# Patient Record
Sex: FEMALE | Race: BLACK OR AFRICAN AMERICAN | Employment: UNEMPLOYED | URBAN - METROPOLITAN AREA
[De-identification: names, ages, dates, MRNs, and addresses within clinical notes are randomized per-mention and may not be internally consistent; named-entity substitution may affect disease eponyms.]

---

## 2022-02-13 ENCOUNTER — APPOINTMENT (OUTPATIENT)
Dept: GENERAL RADIOLOGY | Age: 52
End: 2022-02-13
Payer: MEDICARE

## 2022-02-13 ENCOUNTER — HOSPITAL ENCOUNTER (EMERGENCY)
Age: 52
Discharge: HOME | End: 2022-02-13
Payer: MEDICARE

## 2022-02-13 VITALS
DIASTOLIC BLOOD PRESSURE: 97 MMHG | BODY MASS INDEX: 30.38 KG/M2 | SYSTOLIC BLOOD PRESSURE: 147 MMHG | HEART RATE: 92 BPM | OXYGEN SATURATION: 100 % | TEMPERATURE: 97 F | HEIGHT: 60 IN | RESPIRATION RATE: 16 BRPM | WEIGHT: 154.76 LBS
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
## 2022-02-13 DIAGNOSIS — S82.831A CLOSED FRACTURE OF DISTAL END OF RIGHT FIBULA, UNSPECIFIED FRACTURE MORPHOLOGY, INITIAL ENCOUNTER: Primary | ICD-10-CM

## 2022-02-13 PROCEDURE — 73610 X-RAY EXAM OF ANKLE: CPT

## 2022-02-13 PROCEDURE — 29515 APPLICATION SHORT LEG SPLINT: CPT

## 2022-02-13 PROCEDURE — 99283 EMERGENCY DEPT VISIT LOW MDM: CPT

## 2022-02-13 PROCEDURE — 6370000000 HC RX 637 (ALT 250 FOR IP): Performed by: NURSE PRACTITIONER

## 2022-02-13 PROCEDURE — 73620 X-RAY EXAM OF FOOT: CPT

## 2022-02-13 RX ORDER — IBUPROFEN 400 MG/1
800 TABLET ORAL ONCE
Status: COMPLETED | OUTPATIENT
Start: 2022-02-13 | End: 2022-02-13

## 2022-02-13 RX ORDER — HYDROCODONE BITARTRATE AND ACETAMINOPHEN 5; 325 MG/1; MG/1
1 TABLET ORAL ONCE
Status: COMPLETED | OUTPATIENT
Start: 2022-02-13 | End: 2022-02-13

## 2022-02-13 RX ORDER — HYDROCODONE BITARTRATE AND ACETAMINOPHEN 5; 325 MG/1; MG/1
1 TABLET ORAL EVERY 6 HOURS PRN
Qty: 10 TABLET | Refills: 0 | Status: SHIPPED | OUTPATIENT
Start: 2022-02-13 | End: 2022-02-16

## 2022-02-13 RX ORDER — NAPROXEN 500 MG/1
500 TABLET ORAL 2 TIMES DAILY PRN
Qty: 30 TABLET | Refills: 0 | Status: SHIPPED | OUTPATIENT
Start: 2022-02-13

## 2022-02-13 RX ADMIN — IBUPROFEN 800 MG: 400 TABLET, FILM COATED ORAL at 10:49

## 2022-02-13 RX ADMIN — HYDROCODONE BITARTRATE AND ACETAMINOPHEN 1 TABLET: 5; 325 TABLET ORAL at 10:49

## 2022-02-13 ASSESSMENT — PAIN DESCRIPTION - FREQUENCY: FREQUENCY: CONTINUOUS

## 2022-02-13 ASSESSMENT — PAIN SCALES - GENERAL
PAINLEVEL_OUTOF10: 8
PAINLEVEL_OUTOF10: 8

## 2022-02-13 ASSESSMENT — PAIN DESCRIPTION - ORIENTATION: ORIENTATION: RIGHT

## 2022-02-13 ASSESSMENT — PAIN DESCRIPTION - DESCRIPTORS: DESCRIPTORS: SORE

## 2022-02-13 ASSESSMENT — PAIN DESCRIPTION - PAIN TYPE: TYPE: ACUTE PAIN

## 2022-02-13 ASSESSMENT — PAIN DESCRIPTION - LOCATION: LOCATION: ANKLE

## 2022-02-13 NOTE — Clinical Note
Arabella Rodríguez was seen and treated in our emergency department on 2/13/2022. She may return to work on 02/20/2022. If you have any questions or concerns, please don't hesitate to call.       Faraz Purvis, APRN - CNP

## 2022-02-13 NOTE — ED PROVIDER NOTES
[address]  [address] [place] [address]  Dept: [phone]  Loc: [address] ENCOUNTER        This patient was not seen or evaluated by the attending physician. I evaluated this patient, the attending physician was available for consultation. CHIEF COMPLAINT    Chief Complaint   Patient presents with    Ankle Pain     Pt arrives with walker for eval of right ankle pain after slipping on ice Thursday ,hx of previous cva and surgery to right ankle       HPI    Gonzalo Velez is a 50 y.o. female who presents with pain localized in the right ankle. Onset was 4 days ago. The context was she was walking out of her house when she slipped on a patch of ice and fall. She states that she has had surgery to her ankle before in the past, and has been steadily swallowing since the injury event. No fevers or chills. No numbness or tingling distal to site of injury. Has been ambulatory with her walker, which she generally does need given her history of CVA, and some residual right-sided weakness from that. The pain severity is 8/10. The patient has no other associated injuries. The pain is aggravated by ambulation. States that she came to the emergency department for further evaluation and treatment. REVIEW OF SYSTEMS    General: No fever  Skin: No lacerations or puncture wounds  Musculoskeletal: see HPI, no other joint pain    PAST MEDICAL & SURGICAL HISTORY    Past Medical History:   Diagnosis Date    Hypertension      No past surgical history on file. CURRENT MEDICATIONS  (may include discharge medications prescribed in the ED)  Current Outpatient Rx   Medication Sig Dispense Refill    HYDROcodone-acetaminophen (NORCO) 5-325 MG per tablet Take 1 tablet by mouth every 6 hours as needed for Pain for up to 3 days. Intended supply: 3 days.  Take lowest dose possible to manage pain 10 tablet 0    naproxen (NAPROSYN) 500 MG tablet Take 1 tablet by mouth 2 times daily as needed for Pain (swelling) 30 tablet 0    lisinopril (PRINIVIL;ZESTRIL) 20 MG tablet Take 20 mg by mouth daily. Patient unsure of home dose. ALLERGIES    No Known Allergies    SOCIAL & FAMILY HISTORY    Social History     Socioeconomic History    Marital status: Single     Spouse name: Not on file    Number of children: Not on file    Years of education: Not on file    Highest education level: Not on file   Occupational History    Not on file   Tobacco Use    Smoking status: Never Smoker    Smokeless tobacco: Not on file   Substance and Sexual Activity    Alcohol use: No    Drug use: Not on file    Sexual activity: Not on file   Other Topics Concern    Not on file   Social History Narrative    Not on file     Social Determinants of Health     Financial Resource Strain:     Difficulty of Paying Living Expenses: Not on file   Food Insecurity:     Worried About Running Out of Food in the Last Year: Not on file    Luda of Food in the Last Year: Not on file   Transportation Needs:     Lack of Transportation (Medical): Not on file    Lack of Transportation (Non-Medical):  Not on file   Physical Activity:     Days of Exercise per Week: Not on file    Minutes of Exercise per Session: Not on file   Stress:     Feeling of Stress : Not on file   Social Connections:     Frequency of Communication with Friends and Family: Not on file    Frequency of Social Gatherings with Friends and Family: Not on file    Attends Hindu Services: Not on file    Active Member of Clubs or Organizations: Not on file    Attends Club or Organization Meetings: Not on file    Marital Status: Not on file   Intimate Partner Violence:     Fear of Current or Ex-Partner: Not on file    Emotionally Abused: Not on file    Physically Abused: Not on file    Sexually Abused: Not on file   Housing Stability:     Unable to Pay for Housing in the Last Year: Not on file    Number of Places Lived in the Last Year: Not on file    Unstable Housing in the Last Year: Not on file     No family history on file. PHYSICAL EXAM    VITAL SIGNS: BP (!) 147/97   Pulse 92   Temp 97 °F (36.1 °C) (Oral)   Resp 16   Ht 5' (1.524 m)   Wt 154 lb 12.2 oz (70.2 kg)   SpO2 100%   BMI 30.23 kg/m²   Constitutional:  Well developed, appears comfortable  HENT:  Atraumatic  Respiratory:  No retractions  Vascular: right DP pulse 2+, cap refill <3 sec  Musculoskeletal: Examination of the affected ankle and foot reveals: +generalized anterior foot and bilateral malleolar edema, no obvious deformity, no bony tenderness of the lateral malleolus, no bony tenderness of the medial malleolus, no navicular tenderness, no bony tenderness at the base of the fifth metatarsal; the ankle joint is stable, no intraosseous membrane tenderness, no proximal fibular tenderness  Integument:  No open wounds of the affected ankle, no erythema of the ankle  Neurologic:  Awake, alert, no slurred speech, sensation and motor intact in the affected extremity    RADIOLOGY   XR FOOT RIGHT (2 VIEWS)   Final Result   No acute osseous abnormality of the left foot. XR ANKLE RIGHT (MIN 3 VIEWS)   Final Result   Traumatic, acute, minimally displaced distal fibular fractures. PROCEDURES   SPLINT PLACEMENT  A short leg OCL splint was applied to the affected limb by the emergency department technician. I evaluated the splint after it was applied. The splint was in good position. The patient's extremity was neurovascularly intact after the splint placement. ED COURSE & MEDICAL DECISION MAKING    See EMR for medications prescribed. Differential diagnosis: fracture, dislocation, grade 3 sprain, syndesmotic sprain, vascular injury, neurologic injury, tendon injury, other    No evidence of neurovascular injury on exam.  Plain films as above.     I estimate there is LOW risk for COMPARTMENT SYNDROME, DEEP VENOUS THROMBOSIS, SEPTIC ARTHRITIS, OR NEUROVASCULAR INJURY, thus I consider the discharge disposition reasonable. Sandra Wesley and I have discussed the diagnosis and risks, and we agree with discharging home to follow-up with their primary doctor or the referral orthopedist. We also discussed returning to the Emergency Department immediately if new or worsening symptoms occur. We have discussed the symptoms which are most concerning (e.g., changing or worsening pain, numbness, weakness) that necessitate immediate return. Blood pressure (!) 147/97, pulse 92, temperature 97 °F (36.1 °C), temperature source Oral, resp. rate 16, height 5' (1.524 m), weight 154 lb 12.2 oz (70.2 kg), SpO2 100 %. I instructed the patient to follow up as an outpatient in 2 days. I instructed the patient to return to the ED immediately for any new or worsening symptoms. The patient verbalizes understanding. FINAL IMPRESSION    1.  Closed fracture of distal end of right fibula, unspecified fracture morphology, initial encounter        PLAN  Discharge with outpatient follow-up (see EMR)    (Please note that this note was completed with a voice recognition program.  Every attempt was made to edit the dictations, but inevitably there remain words that are mis-transcribed.)            Marie Jay, LESLIE - CNP  02/13/22 0248

## 2022-02-16 ENCOUNTER — OFFICE VISIT (OUTPATIENT)
Dept: ORTHOPEDIC SURGERY | Age: 52
End: 2022-02-16
Payer: MEDICARE

## 2022-02-16 VITALS — WEIGHT: 154 LBS | BODY MASS INDEX: 30.23 KG/M2 | HEIGHT: 60 IN

## 2022-02-16 DIAGNOSIS — S82.831A CLOSED FRACTURE OF DISTAL END OF RIGHT FIBULA, UNSPECIFIED FRACTURE MORPHOLOGY, INITIAL ENCOUNTER: Primary | ICD-10-CM

## 2022-02-16 PROCEDURE — G8419 CALC BMI OUT NRM PARAM NOF/U: HCPCS | Performed by: ORTHOPAEDIC SURGERY

## 2022-02-16 PROCEDURE — 1036F TOBACCO NON-USER: CPT | Performed by: ORTHOPAEDIC SURGERY

## 2022-02-16 PROCEDURE — G8427 DOCREV CUR MEDS BY ELIG CLIN: HCPCS | Performed by: ORTHOPAEDIC SURGERY

## 2022-02-16 PROCEDURE — 3017F COLORECTAL CA SCREEN DOC REV: CPT | Performed by: ORTHOPAEDIC SURGERY

## 2022-02-16 PROCEDURE — L4360 PNEUMAT WALKING BOOT PRE CST: HCPCS | Performed by: ORTHOPAEDIC SURGERY

## 2022-02-16 PROCEDURE — G8484 FLU IMMUNIZE NO ADMIN: HCPCS | Performed by: ORTHOPAEDIC SURGERY

## 2022-02-16 PROCEDURE — 99203 OFFICE O/P NEW LOW 30 MIN: CPT | Performed by: ORTHOPAEDIC SURGERY

## 2022-02-16 NOTE — PROGRESS NOTES
CHIEF COMPLAINT: Right ankle pain / lateral malleolus fracture. DATE OF INJURY: 2/10/2022    HISTORY:  Ms. Ryan Perez 50 y.o.  female presents today for the first visit for evaluation of a right ankle injury which occurred when she was walking and slipped on ice. She was first seen and evaluated in [facility] , where she was x-rayed, splinted and asked to f/u with Orthopedics. She is complaining of lateral ankle pain and swelling. This is better with elevation and worse with bearing any wt. The pain is sharp and not radiating. No numbness or tingling sensation. Alleviating factors: elevation and rest. No other complaint. She has a h/o CVA in 2013 with good recovery. She also has a h/o right ankle surgery likely peroneal tendon in 2019 by a podiatry. Past Medical History:   Diagnosis Date    Hypertension        History reviewed. No pertinent surgical history. Social History     Socioeconomic History    Marital status: Single     Spouse name: Not on file    Number of children: Not on file    Years of education: Not on file    Highest education level: Not on file   Occupational History    Not on file   Tobacco Use    Smoking status: Never Smoker    Smokeless tobacco: Never Used   Vaping Use    Vaping Use: Never used   Substance and Sexual Activity    Alcohol use: No    Drug use: Not on file    Sexual activity: Not on file   Other Topics Concern    Not on file   Social History Narrative    Not on file     Social Determinants of Health     Financial Resource Strain:     Difficulty of Paying Living Expenses: Not on file   Food Insecurity:     Worried About Running Out of Food in the Last Year: Not on file    Luda of Food in the Last Year: Not on file   Transportation Needs:     Lack of Transportation (Medical): Not on file    Lack of Transportation (Non-Medical):  Not on file   Physical Activity:     Days of Exercise per Week: Not on file    Minutes of Exercise per Session: Not on file   Stress:     Feeling of Stress : Not on file   Social Connections:     Frequency of Communication with Friends and Family: Not on file    Frequency of Social Gatherings with Friends and Family: Not on file    Attends Sikhism Services: Not on file    Active Member of Clubs or Organizations: Not on file    Attends Club or Organization Meetings: Not on file    Marital Status: Not on file   Intimate Partner Violence:     Fear of Current or Ex-Partner: Not on file    Emotionally Abused: Not on file    Physically Abused: Not on file    Sexually Abused: Not on file   Housing Stability:     Unable to Pay for Housing in the Last Year: Not on file    Number of [place] in the Last Year: Not on file    Unstable Housing in the Last Year: Not on file       History reviewed. No pertinent family history. Current Outpatient Medications on File Prior to Visit   Medication Sig Dispense Refill    HYDROcodone-acetaminophen (NORCO) 5-325 MG per tablet Take 1 tablet by mouth every 6 hours as needed for Pain for up to 3 days. Intended supply: 3 days. Take lowest dose possible to manage pain 10 tablet 0    naproxen (NAPROSYN) 500 MG tablet Take 1 tablet by mouth 2 times daily as needed for Pain (swelling) 30 tablet 0    lisinopril (PRINIVIL;ZESTRIL) 20 MG tablet Take 20 mg by mouth daily. Patient unsure of home dose. No current facility-administered medications on file prior to visit. Pertinent items are noted in HPI  Review of systems reviewed from Patient History Form and available in the patient's chart under the Media tab. PHYSICAL EXAMINATION:  Ms. Manoj Hunter is a very pleasant 50 y.o.  female who presents today in no acute distress, awake, alert, and oriented. She is well dressed, nourished and  groomed. Patient with normal affect. Height is  5' (1.524 m), weight is 154 lb (69.9 kg), Body mass index is 30.08 kg/m². Resting respiratory rate is 16.      Examination of the gait, showed that the patient walks with a walker, NWB right leg and in a splint . Examination of both ankles showing a decreased range of motion of the right ankle compare to the other side. There is moderate swelling that can be seen, as well as ecchymosis. She has intact sensation and good pedal pulses. She has significant tenderness on deep palpation over the medial & lateral malleolus of the right ankle. IMAGING: Xrays, 3 views of the right ankle dated 2/13/2022 from [facility],  were reviewed, and showed a moderately displaced lateral malleolus fracture. IMPRESSION: Right ankle moderately displaced lateral malleolus fracture. PLAN:   I discussed with Luh Khan the overall alignment of the fracture and treatment options including both surgical and non-surgical treatment, and that my recommendation is an open reduction and internal fixation given the amount of displacement of the fracture and the ability to walk on in 2 weeks postop. I discussed the risks and benefits of surgery with the patient, including but not limited to infection, bleeding, pain, injury to nerves or blood vessels failure of the surgery and need for additional surgery. All the patient's questions were answered. We discussed an expected post-operative course. She  is understanding of this and wishes to think about it.        Jackie Tearn MD

## 2022-02-16 NOTE — LETTER
[address] [facility]  Surgery Scheduling Form:      DEMOGRAPHICS:                                                                                                                  Patient Name:  Heide Salazar  Patient :  1970   Patient SS#:      Patient Phone:  [phone] (home)  Alt. Patient Phone:    Patient Address:  [address]  [address] [address]    PCP:  No primary care provider on file. Insurance ID Number:  Payor/Plan Subscr  Sex Relation Sub. Ins. ID Effective Group Num   1. Dontae [id number] 1970 Female Self [id number]                                   [address] [facility]   2. BCBS MEDICAREEligha Hark 1970 Female Self [id number] [facility]                                    [id number]         DIAGNOSIS & PROCEDURE:                                                                                            .    Diagnosis:   Right distal fibula fracture S82.891A  Operation:  Open reduction internal fixation right fibula 03635  Location: [place]  Provider:  Vane Bales MD      SCHEDULING INFORMATION:                                                                                         .    Requested Date:  22   OR Time:  12:30                      Patient Arrival Time:  10:30  OR Time Required:  60 Minutes  Anesthesia:  General  Equipment:  Sofía  Mini C-Arm:  No   Standard C-Arm:  Yes  Status:  Outpatient  PAT Required:  Yes  Comments: Eugenia Bales MD     22             Pre Operative Physician Prophylaxis Orders - SCIP Protocols      Patient: Heide Salazar  :    1970    Procedure:  22 Open reduction internal fixation right fibula [id number]    COVID RAPID  HEIGHT:  Ht Readings from Last 1 Encounters:   22 5' (1.524 m)                      WEIGHT:  Wt Readings from Last 1 Encounters:   22 154 lb (69.9 kg)         History & Physical:  Vandana.Radha ] By Surgeon  [ ]By PCP  [ Janessa Levels Report     Pre-Operative Antibiotic Order:     []  ----  No Antibiotic Ordered       [x]  ----  Give the following Antibiotic within 1 hour prior to start time:                                                      Cefazolin 2g IV <119.9kg (264lbs) OR 3g if >120kg (004OII)       or      Clindamycin 900 mg IV (over 1 hour) if patient is allergic to           PENICILLINS or CAPHALOSPORIN       VTE prophylaxis [ ] Thigh hi  TEDS  [ ]  Knee Hi TEDS [ ] Foot Pumps [ ] Compression Devices      Physician Signature:     Date: 2/17/22  Time: 10:53 AM

## 2022-02-16 NOTE — LETTER
[facility]  [place] [address] [address] [address]  Phone: [phone]  Fax: [phone]    Ashwini Rios MD        February 16, 2022     Patient: Lorri Wang   YOB: 1970   Date of Visit: 2/16/2022       To Whom It May Concern: It is my medical opinion that Lorri Wang should remain out of work until 3/30/2022. .    If you have any questions or concerns, please don't hesitate to call.     Sincerely,          Ashwini Rios MD

## 2022-02-17 ENCOUNTER — ANESTHESIA EVENT (OUTPATIENT)
Dept: OPERATING ROOM | Age: 52
End: 2022-02-17
Payer: MEDICARE

## 2022-02-17 ENCOUNTER — TELEPHONE (OUTPATIENT)
Dept: ORTHOPEDIC SURGERY | Age: 52
End: 2022-02-17

## 2022-02-17 RX ORDER — HYDROCHLOROTHIAZIDE 25 MG/1
25 TABLET ORAL DAILY
COMMUNITY

## 2022-02-17 NOTE — TELEPHONE ENCOUNTER
Tried calling patient again. Not sure if we should cancel patient. When she spoke to PAT she asked if we are going to pay for an uber to pick her up. Patient will not answer or return my phone calls.

## 2022-02-17 NOTE — PROGRESS NOTES
C-Difficile admission screening and protocol:     * Admitted with diarrhea? YES____    NO__X___     *Prior history of C-Diff. In last 3 months? YES____   NO__X___     *Antibiotic use in the past 6-8 weeks? NO____X__YES______                 If yes which  ANTIBIOTIC AND REASON______     *Prior hospitalization or nursing home in the last month?  YES____   NO_X___

## 2022-02-17 NOTE — TELEPHONE ENCOUNTER
Attempted a follow up call (as I told patient I would in previous voicemail). Asked patient to call our office if she still wants to discuss risks of not having surgery.

## 2022-02-17 NOTE — TELEPHONE ENCOUNTER
Attempted to call patient back to discuss risk of not having surgery. Will attempt a return call by 3 PM to patient. Asked patient to return call if we do not reach her by 3 PM today.

## 2022-02-17 NOTE — PROGRESS NOTES
[facility] time____1030________        Surgery time______1230______    Take the following medications with a sip of water: Follow your MD/Surgeons pre-procedure instructions regarding your medications    Do not eat or drink anything after 12:00 midnight prior to your surgery. This includes water chewing gum, mints and ice chips. You may brush your teeth and gargle the morning of your surgery, but do not swallow the water     Please see your family doctor/pediatrician for a history and physical and/or concerning medications. Bring any test results/reports from your physicians office. If you are under the care of a heart doctor or specialist doctor, please be aware that you may be asked to them for clearance    You may be asked to stop blood thinners such as Coumadin, Plavix, Fragmin, Lovenox, etc., or any anti-inflammatories such as:  Aspirin, Ibuprofen, Advil, Naproxen prior to your surgery. We also ask that you stop any OTC medications such as fish oil, vitamin E, glucosamine, garlic, Multivitamins, COQ 10, etc.    We ask that you do not smoke 24 hours prior to surgery  We ask that you do not  drink any alcoholic beverages 24 hours prior to surgery     You must make arrangements for a responsible adult to take you home after your surgery. For your safety you will not be allowed to leave alone or drive yourself home. Your surgery will be cancelled if you do not have a ride home. Also for your safety, it is strongly suggested that someone stay with you the first 24 hours after your surgery. A parent or legal guardian must accompany a child scheduled for surgery and plan to stay at the hospital until the child is discharged. Please do not bring other children with you. For your comfort, please wear simple loose fitting clothing to the hospital.  Please do not bring valuables.     Do not wear any make-up or nail polish on your fingers or toes      For your safety, please do not wear any jewelry or body piercing's on the day of surgery. All jewelry must be removed. If you have dentures, they will be removed before going to operating room. For your convenience, we will provide you with a container. If you wear contact lenses or glasses, they will be removed, please bring a case for them. If you have a living will and a durable power of  for healthcare, please bring in a copy. As part of our patient safety program to minimize surgical site infections, we ask you to do the following:    · Please notify your surgeon if you develop any illness between         now and the  day of your surgery. · This includes a cough, cold, fever, sore throat, nausea,         or vomiting, and diarrhea, etc.  ·  Please notify your surgeon if you experience dizziness, shortness         of breath or blurred vision between now and the time of your surgery. Do not shave your operative site 96 hours prior to surgery. For face and neck surgery, men may use an electric razor 48 hours   prior to surgery. You may shower the night before surgery or the morning of   your surgery with an antibacterial soap. You will need to bring a photo ID and insurance card    [facility] has an onsite pharmacy, would you like to utilize our pharmacy     If you will be staying overnight and use a C-pap machine, please bring   your C-pap to hospital     Our goal is to provide you with excellent care, therefore, visitors will be limited to two(2) in the room at a time so that we may focus on providing this care for you. Please contact pre-admission testing if you have any further questions. [facility] phone number:  [phone] PAT fax number:  [phone]  Please note these are generalized instructions for all surgical cases, you may be provided with more specific instructions according to your surgery.

## 2022-02-18 ENCOUNTER — HOSPITAL ENCOUNTER (OUTPATIENT)
Age: 52
Setting detail: OUTPATIENT SURGERY
Discharge: HOME | End: 2022-02-18
Attending: ORTHOPAEDIC SURGERY | Admitting: ORTHOPAEDIC SURGERY
Payer: MEDICARE

## 2022-02-18 ENCOUNTER — APPOINTMENT (OUTPATIENT)
Dept: GENERAL RADIOLOGY | Age: 52
End: 2022-02-18
Attending: ORTHOPAEDIC SURGERY
Payer: MEDICARE

## 2022-02-18 ENCOUNTER — ANESTHESIA (OUTPATIENT)
Dept: OPERATING ROOM | Age: 52
End: 2022-02-18
Payer: MEDICARE

## 2022-02-18 VITALS
SYSTOLIC BLOOD PRESSURE: 136 MMHG | TEMPERATURE: 97.4 F | WEIGHT: 140 LBS | OXYGEN SATURATION: 98 % | HEIGHT: 60 IN | DIASTOLIC BLOOD PRESSURE: 90 MMHG | BODY MASS INDEX: 27.48 KG/M2 | HEART RATE: 78 BPM | RESPIRATION RATE: 16 BRPM

## 2022-02-18 VITALS
TEMPERATURE: 94.6 F | OXYGEN SATURATION: 98 % | DIASTOLIC BLOOD PRESSURE: 64 MMHG | SYSTOLIC BLOOD PRESSURE: 98 MMHG | RESPIRATION RATE: 1 BRPM

## 2022-02-18 DIAGNOSIS — S82.831A OTHER CLOSED FRACTURE OF DISTAL END OF RIGHT FIBULA, INITIAL ENCOUNTER: Primary | ICD-10-CM

## 2022-02-18 LAB — SARS-COV-2, NAAT: NOT DETECTED

## 2022-02-18 PROCEDURE — 2500000003 HC RX 250 WO HCPCS: Performed by: ORTHOPAEDIC SURGERY

## 2022-02-18 PROCEDURE — 7100000011 HC PHASE II RECOVERY - ADDTL 15 MIN: Performed by: ORTHOPAEDIC SURGERY

## 2022-02-18 PROCEDURE — 6360000002 HC RX W HCPCS: Performed by: ANESTHESIOLOGY

## 2022-02-18 PROCEDURE — 7100000010 HC PHASE II RECOVERY - FIRST 15 MIN: Performed by: ORTHOPAEDIC SURGERY

## 2022-02-18 PROCEDURE — 6360000002 HC RX W HCPCS

## 2022-02-18 PROCEDURE — 3700000000 HC ANESTHESIA ATTENDED CARE: Performed by: ORTHOPAEDIC SURGERY

## 2022-02-18 PROCEDURE — 3700000001 HC ADD 15 MINUTES (ANESTHESIA): Performed by: ORTHOPAEDIC SURGERY

## 2022-02-18 PROCEDURE — 7100000001 HC PACU RECOVERY - ADDTL 15 MIN: Performed by: ORTHOPAEDIC SURGERY

## 2022-02-18 PROCEDURE — 3600000004 HC SURGERY LEVEL 4 BASE: Performed by: ORTHOPAEDIC SURGERY

## 2022-02-18 PROCEDURE — 2580000003 HC RX 258: Performed by: ANESTHESIOLOGY

## 2022-02-18 PROCEDURE — 2580000003 HC RX 258: Performed by: ORTHOPAEDIC SURGERY

## 2022-02-18 PROCEDURE — 7100000000 HC PACU RECOVERY - FIRST 15 MIN: Performed by: ORTHOPAEDIC SURGERY

## 2022-02-18 PROCEDURE — 6360000002 HC RX W HCPCS: Performed by: ORTHOPAEDIC SURGERY

## 2022-02-18 PROCEDURE — 3600000014 HC SURGERY LEVEL 4 ADDTL 15MIN: Performed by: ORTHOPAEDIC SURGERY

## 2022-02-18 PROCEDURE — 2720000010 HC SURG SUPPLY STERILE: Performed by: ORTHOPAEDIC SURGERY

## 2022-02-18 PROCEDURE — A4217 STERILE WATER/SALINE, 500 ML: HCPCS | Performed by: ORTHOPAEDIC SURGERY

## 2022-02-18 PROCEDURE — 87635 SARS-COV-2 COVID-19 AMP PRB: CPT

## 2022-02-18 PROCEDURE — 73610 X-RAY EXAM OF ANKLE: CPT

## 2022-02-18 PROCEDURE — 2500000003 HC RX 250 WO HCPCS

## 2022-02-18 PROCEDURE — 27792 TREATMENT OF ANKLE FRACTURE: CPT | Performed by: ORTHOPAEDIC SURGERY

## 2022-02-18 PROCEDURE — C1713 ANCHOR/SCREW BN/BN,TIS/BN: HCPCS | Performed by: ORTHOPAEDIC SURGERY

## 2022-02-18 PROCEDURE — 2709999900 HC NON-CHARGEABLE SUPPLY: Performed by: ORTHOPAEDIC SURGERY

## 2022-02-18 PROCEDURE — 3209999900 FLUORO FOR SURGICAL PROCEDURES

## 2022-02-18 PROCEDURE — 6370000000 HC RX 637 (ALT 250 FOR IP): Performed by: STUDENT IN AN ORGANIZED HEALTH CARE EDUCATION/TRAINING PROGRAM

## 2022-02-18 DEVICE — SCREW BNE L14MM DIA2.7MM HEX HD DIA2.5MM CANC BIODUR 108C: Type: IMPLANTABLE DEVICE | Site: ANKLE | Status: FUNCTIONAL

## 2022-02-18 DEVICE — SCREW BNE L16MM DIA2.7MM LNG CORT FT ANK S STL ST: Type: IMPLANTABLE DEVICE | Site: ANKLE | Status: FUNCTIONAL

## 2022-02-18 DEVICE — SCREW BNE L18MM DIA2.7MM HEX HD DIA2.5MM CANC BIODUR 108C: Type: IMPLANTABLE DEVICE | Site: ANKLE | Status: FUNCTIONAL

## 2022-02-18 DEVICE — SCREW BNE L14MM DIA3.5MM HD DIA2.7MM CORT PERIARTC S STL ST: Type: IMPLANTABLE DEVICE | Site: ANKLE | Status: FUNCTIONAL

## 2022-02-18 DEVICE — SCREW BNE L16MM DIA2.7MM HEX HD DIA2.5MM CANC BIODUR 108C: Type: IMPLANTABLE DEVICE | Site: ANKLE | Status: FUNCTIONAL

## 2022-02-18 DEVICE — PLATE BNE L80MM 4 H R LAT DST PERIARTC FIBULAR S STL LOK: Type: IMPLANTABLE DEVICE | Site: ANKLE | Status: FUNCTIONAL

## 2022-02-18 DEVICE — SCREW BNE L16MM DIA3.5MM HD DIA2.7MM PERIARTC CORT S STL ST: Type: IMPLANTABLE DEVICE | Site: ANKLE | Status: FUNCTIONAL

## 2022-02-18 RX ORDER — PROPOFOL 10 MG/ML
INJECTION, EMULSION INTRAVENOUS PRN
Status: DISCONTINUED | OUTPATIENT
Start: 2022-02-18 | End: 2022-02-18 | Stop reason: SDUPTHER

## 2022-02-18 RX ORDER — SODIUM CHLORIDE 0.9 % (FLUSH) 0.9 %
10 SYRINGE (ML) INJECTION EVERY 12 HOURS SCHEDULED
Status: DISCONTINUED | OUTPATIENT
Start: 2022-02-18 | End: 2022-02-18 | Stop reason: HOSPADM

## 2022-02-18 RX ORDER — MIDAZOLAM HYDROCHLORIDE 1 MG/ML
INJECTION INTRAMUSCULAR; INTRAVENOUS PRN
Status: DISCONTINUED | OUTPATIENT
Start: 2022-02-18 | End: 2022-02-18 | Stop reason: SDUPTHER

## 2022-02-18 RX ORDER — SODIUM CHLORIDE 9 MG/ML
25 INJECTION, SOLUTION INTRAVENOUS PRN
Status: DISCONTINUED | OUTPATIENT
Start: 2022-02-18 | End: 2022-02-18 | Stop reason: HOSPADM

## 2022-02-18 RX ORDER — SODIUM CHLORIDE 0.9 % (FLUSH) 0.9 %
5-40 SYRINGE (ML) INJECTION PRN
Status: DISCONTINUED | OUTPATIENT
Start: 2022-02-18 | End: 2022-02-18 | Stop reason: HOSPADM

## 2022-02-18 RX ORDER — BUPIVACAINE HYDROCHLORIDE 5 MG/ML
INJECTION, SOLUTION EPIDURAL; INTRACAUDAL
Status: COMPLETED | OUTPATIENT
Start: 2022-02-18 | End: 2022-02-18

## 2022-02-18 RX ORDER — LIDOCAINE HYDROCHLORIDE 20 MG/ML
INJECTION, SOLUTION EPIDURAL; INFILTRATION; INTRACAUDAL; PERINEURAL PRN
Status: DISCONTINUED | OUTPATIENT
Start: 2022-02-18 | End: 2022-02-18 | Stop reason: SDUPTHER

## 2022-02-18 RX ORDER — SODIUM CHLORIDE 0.9 % (FLUSH) 0.9 %
5-40 SYRINGE (ML) INJECTION EVERY 12 HOURS SCHEDULED
Status: DISCONTINUED | OUTPATIENT
Start: 2022-02-18 | End: 2022-02-18 | Stop reason: HOSPADM

## 2022-02-18 RX ORDER — CEPHALEXIN 500 MG/1
500 CAPSULE ORAL 4 TIMES DAILY
Qty: 20 CAPSULE | Refills: 0 | Status: SHIPPED | OUTPATIENT
Start: 2022-02-18 | End: 2022-02-23

## 2022-02-18 RX ORDER — FENTANYL CITRATE 50 UG/ML
25 INJECTION, SOLUTION INTRAMUSCULAR; INTRAVENOUS EVERY 5 MIN PRN
Status: DISCONTINUED | OUTPATIENT
Start: 2022-02-18 | End: 2022-02-18 | Stop reason: HOSPADM

## 2022-02-18 RX ORDER — MEPERIDINE HYDROCHLORIDE 25 MG/ML
12.5 INJECTION INTRAMUSCULAR; INTRAVENOUS; SUBCUTANEOUS EVERY 5 MIN PRN
Status: DISCONTINUED | OUTPATIENT
Start: 2022-02-18 | End: 2022-02-18 | Stop reason: HOSPADM

## 2022-02-18 RX ORDER — FENTANYL CITRATE 50 UG/ML
INJECTION, SOLUTION INTRAMUSCULAR; INTRAVENOUS PRN
Status: DISCONTINUED | OUTPATIENT
Start: 2022-02-18 | End: 2022-02-18 | Stop reason: SDUPTHER

## 2022-02-18 RX ORDER — MAGNESIUM HYDROXIDE 1200 MG/15ML
LIQUID ORAL CONTINUOUS PRN
Status: COMPLETED | OUTPATIENT
Start: 2022-02-18 | End: 2022-02-18

## 2022-02-18 RX ORDER — DEXAMETHASONE SODIUM PHOSPHATE 4 MG/ML
INJECTION, SOLUTION INTRA-ARTICULAR; INTRALESIONAL; INTRAMUSCULAR; INTRAVENOUS; SOFT TISSUE PRN
Status: DISCONTINUED | OUTPATIENT
Start: 2022-02-18 | End: 2022-02-18 | Stop reason: SDUPTHER

## 2022-02-18 RX ORDER — SODIUM CHLORIDE 0.9 % (FLUSH) 0.9 %
10 SYRINGE (ML) INJECTION PRN
Status: DISCONTINUED | OUTPATIENT
Start: 2022-02-18 | End: 2022-02-18 | Stop reason: HOSPADM

## 2022-02-18 RX ORDER — HYDROCODONE BITARTRATE AND ACETAMINOPHEN 5; 325 MG/1; MG/1
1 TABLET ORAL EVERY 6 HOURS PRN
Qty: 12 TABLET | Refills: 0 | Status: SHIPPED | OUTPATIENT
Start: 2022-02-18 | End: 2022-02-21

## 2022-02-18 RX ORDER — HYDROCODONE BITARTRATE AND ACETAMINOPHEN 5; 325 MG/1; MG/1
1 TABLET ORAL
Status: COMPLETED | OUTPATIENT
Start: 2022-02-18 | End: 2022-02-18

## 2022-02-18 RX ORDER — ONDANSETRON 2 MG/ML
4 INJECTION INTRAMUSCULAR; INTRAVENOUS
Status: DISCONTINUED | OUTPATIENT
Start: 2022-02-18 | End: 2022-02-18 | Stop reason: HOSPADM

## 2022-02-18 RX ORDER — HYDROCODONE BITARTRATE AND ACETAMINOPHEN 5; 325 MG/1; MG/1
2 TABLET ORAL
Status: COMPLETED | OUTPATIENT
Start: 2022-02-18 | End: 2022-02-18

## 2022-02-18 RX ORDER — SODIUM CHLORIDE 9 MG/ML
INJECTION, SOLUTION INTRAVENOUS CONTINUOUS
Status: DISCONTINUED | OUTPATIENT
Start: 2022-02-18 | End: 2022-02-18 | Stop reason: HOSPADM

## 2022-02-18 RX ORDER — ONDANSETRON 2 MG/ML
INJECTION INTRAMUSCULAR; INTRAVENOUS PRN
Status: DISCONTINUED | OUTPATIENT
Start: 2022-02-18 | End: 2022-02-18 | Stop reason: SDUPTHER

## 2022-02-18 RX ADMIN — SODIUM CHLORIDE: 9 INJECTION, SOLUTION INTRAVENOUS at 11:15

## 2022-02-18 RX ADMIN — FENTANYL CITRATE 25 MCG: 50 INJECTION INTRAMUSCULAR; INTRAVENOUS at 12:11

## 2022-02-18 RX ADMIN — HYDROCODONE BITARTRATE AND ACETAMINOPHEN 1 TABLET: 5; 325 TABLET ORAL at 16:04

## 2022-02-18 RX ADMIN — HYDROMORPHONE HYDROCHLORIDE 0.25 MG: 1 INJECTION, SOLUTION INTRAMUSCULAR; INTRAVENOUS; SUBCUTANEOUS at 12:30

## 2022-02-18 RX ADMIN — PROPOFOL 200 MG: 10 INJECTION, EMULSION INTRAVENOUS at 12:17

## 2022-02-18 RX ADMIN — HYDROMORPHONE HYDROCHLORIDE 0.5 MG: 1 INJECTION, SOLUTION INTRAMUSCULAR; INTRAVENOUS; SUBCUTANEOUS at 14:10

## 2022-02-18 RX ADMIN — HYDROMORPHONE HYDROCHLORIDE 0.5 MG: 1 INJECTION, SOLUTION INTRAMUSCULAR; INTRAVENOUS; SUBCUTANEOUS at 13:12

## 2022-02-18 RX ADMIN — LIDOCAINE HYDROCHLORIDE 100 MG: 20 INJECTION, SOLUTION EPIDURAL; INFILTRATION; INTRACAUDAL; PERINEURAL at 12:17

## 2022-02-18 RX ADMIN — DEXAMETHASONE SODIUM PHOSPHATE 10 MG: 4 INJECTION, SOLUTION INTRAMUSCULAR; INTRAVENOUS at 12:20

## 2022-02-18 RX ADMIN — FENTANYL CITRATE 25 MCG: 50 INJECTION INTRAMUSCULAR; INTRAVENOUS at 12:25

## 2022-02-18 RX ADMIN — ONDANSETRON 4 MG: 2 INJECTION INTRAMUSCULAR; INTRAVENOUS at 12:33

## 2022-02-18 RX ADMIN — SODIUM CHLORIDE: 9 INJECTION, SOLUTION INTRAVENOUS at 12:11

## 2022-02-18 RX ADMIN — HYDROMORPHONE HYDROCHLORIDE 0.25 MG: 1 INJECTION, SOLUTION INTRAMUSCULAR; INTRAVENOUS; SUBCUTANEOUS at 12:34

## 2022-02-18 RX ADMIN — FENTANYL CITRATE 50 MCG: 50 INJECTION INTRAMUSCULAR; INTRAVENOUS at 12:21

## 2022-02-18 RX ADMIN — CEFAZOLIN 2000 MG: 10 INJECTION, POWDER, FOR SOLUTION INTRAVENOUS at 12:20

## 2022-02-18 RX ADMIN — MIDAZOLAM 2 MG: 1 INJECTION INTRAMUSCULAR; INTRAVENOUS at 12:11

## 2022-02-18 ASSESSMENT — PAIN DESCRIPTION - DESCRIPTORS
DESCRIPTORS: DISCOMFORT
DESCRIPTORS: DISCOMFORT
DESCRIPTORS: ACHING
DESCRIPTORS: ACHING
DESCRIPTORS: DISCOMFORT
DESCRIPTORS: ACHING

## 2022-02-18 ASSESSMENT — PAIN SCALES - GENERAL
PAINLEVEL_OUTOF10: 9
PAINLEVEL_OUTOF10: 10
PAINLEVEL_OUTOF10: 7
PAINLEVEL_OUTOF10: 8
PAINLEVEL_OUTOF10: 8
PAINLEVEL_OUTOF10: 10
PAINLEVEL_OUTOF10: 6

## 2022-02-18 ASSESSMENT — PAIN DESCRIPTION - ONSET
ONSET: ON-GOING

## 2022-02-18 ASSESSMENT — PULMONARY FUNCTION TESTS
PIF_VALUE: 3
PIF_VALUE: 4
PIF_VALUE: 1
PIF_VALUE: 16
PIF_VALUE: 17
PIF_VALUE: 17
PIF_VALUE: 19
PIF_VALUE: 2
PIF_VALUE: 19
PIF_VALUE: 18
PIF_VALUE: 3
PIF_VALUE: 18
PIF_VALUE: 17
PIF_VALUE: 19
PIF_VALUE: 3
PIF_VALUE: 16
PIF_VALUE: 3
PIF_VALUE: 0
PIF_VALUE: 3
PIF_VALUE: 18
PIF_VALUE: 0
PIF_VALUE: 17
PIF_VALUE: 19
PIF_VALUE: 21
PIF_VALUE: 19
PIF_VALUE: 18
PIF_VALUE: 2
PIF_VALUE: 1
PIF_VALUE: 2
PIF_VALUE: 16
PIF_VALUE: 0
PIF_VALUE: 19
PIF_VALUE: 2
PIF_VALUE: 19
PIF_VALUE: 17
PIF_VALUE: 16
PIF_VALUE: 3
PIF_VALUE: 18
PIF_VALUE: 1

## 2022-02-18 ASSESSMENT — ENCOUNTER SYMPTOMS: SHORTNESS OF BREATH: 0

## 2022-02-18 ASSESSMENT — PAIN DESCRIPTION - PROGRESSION
CLINICAL_PROGRESSION: NOT CHANGED
CLINICAL_PROGRESSION: GRADUALLY IMPROVING
CLINICAL_PROGRESSION: GRADUALLY IMPROVING

## 2022-02-18 ASSESSMENT — PAIN DESCRIPTION - PAIN TYPE
TYPE: SURGICAL PAIN

## 2022-02-18 ASSESSMENT — PAIN DESCRIPTION - LOCATION
LOCATION: LEG

## 2022-02-18 ASSESSMENT — PAIN - FUNCTIONAL ASSESSMENT
PAIN_FUNCTIONAL_ASSESSMENT: ACTIVITIES ARE NOT PREVENTED

## 2022-02-18 ASSESSMENT — PAIN DESCRIPTION - FREQUENCY
FREQUENCY: CONTINUOUS

## 2022-02-18 ASSESSMENT — PAIN DESCRIPTION - ORIENTATION
ORIENTATION: RIGHT

## 2022-02-18 NOTE — PROGRESS NOTES
Pt awake, oral airway removed. Pt SCHULTZ to request.  States pain 10/10 and pt immediately back to sleep. Will continue to monitor pt.

## 2022-02-18 NOTE — PROGRESS NOTES
Pt asleep. Wakes easily. States she is hungry. States pain 8/10. Pt willing to go to phase 2 care and take po pain medication. To phase 2.

## 2022-02-18 NOTE — PROGRESS NOTES
Patient has been sleeping. Vss. Son at bedside. He was encouraged to encourage his mother to eat and drink so she could have analgesic.

## 2022-02-18 NOTE — PROGRESS NOTES
Patient still sleepy but slowly waking up. Son assisted her with eating. She tolerated sitting up and po fluids and cookies well. Patient says she wants a pain pill now. Denies nausea. Analgesic given. Vss. Dressing is clean dry intact. Toes are warm, move well, noe well. Patient and son verbalized understanding of discharge instructions.

## 2022-02-18 NOTE — LETTER
[address]  Phone: [phone]             February 18, 2022    Patient: Gonzalo Velez   YOB: 1970   Date of Visit: 2/18/2022       To Whom It May Concern:    Gonzalo Velez was seen and treated in our facility  beginning 2/18/2022. Her son Afsaneh Eduardo accompanied her for this.       Sincerely,       Thelma Galvez RN         Signature:__________________________________

## 2022-02-18 NOTE — PROGRESS NOTES
To pacu from OR. Pt asleep with oral airway in place. Dressing to right lower leg dry and intact. Toes to right foot warm with brisk cap refill. Foot of bed elevated and ice to right foot. IV infusing. Monitor in sinus rhythm.

## 2022-02-18 NOTE — ANESTHESIA PRE PROCEDURE
Department of Anesthesiology  Preprocedure Note       Name:  Ave Sethi   Age:  50 y.o.  :  1970                                          MRN:  [id number]         Date:  2022      Surgeon: Mara Aragon):  Angelita Valencia MD    Procedure: Procedure(s):  OPEN REDUCTION INTERNAL FIXATION RIGHT FIBULA    Medications prior to admission:   Prior to Admission medications    Medication Sig Start Date End Date Taking? Authorizing Provider   hydroCHLOROthiazide (HYDRODIURIL) 25 MG tablet Take 25 mg by mouth daily   Yes Historical Provider, MD   naproxen (NAPROSYN) 500 MG tablet Take 1 tablet by mouth 2 times daily as needed for Pain (swelling) 22  Yes Geroge Nose, APRN - CNP   lisinopril (PRINIVIL;ZESTRIL) 20 MG tablet Take 10 mg by mouth daily .    Yes Historical Provider, MD       Current medications:    Current Facility-Administered Medications   Medication Dose Route Frequency Provider Last Rate Last Admin    ceFAZolin (ANCEF) 2000 mg in dextrose 5 % 100 mL IVPB  2,000 mg IntraVENous Once Angelita Valencia MD        0.9 % sodium chloride infusion   IntraVENous Continuous Mitchell De La Cruz MD        sodium chloride flush 0.9 % injection 10 mL  10 mL IntraVENous 2 times per day Mitchell De La Cruz MD        sodium chloride flush 0.9 % injection 10 mL  10 mL IntraVENous PRN Mitchell De La Cruz MD        0.9 % sodium chloride infusion  25 mL IntraVENous PRN Mitchell De La Cruz MD           Allergies:  No Known Allergies    Problem List:    Patient Active Problem List   Diagnosis Code    Closed fracture of right distal fibula S82.831A       Past Medical History:        Diagnosis Date    Cerebral artery occlusion with cerebral infarction ([facility] [address] 2014    cerebral hemorrhage--right side weakness    History of anemia     Hypertension     Wears glasses        Past Surgical History:        Procedure Laterality Date    FRACTURE SURGERY Right     d/t stroke --tried to lengthen the ligaments to stop toes from curling Social History:    Social History     Tobacco Use    Smoking status: Never Smoker    Smokeless tobacco: Never Used   Substance Use Topics    Alcohol use: No                                Counseling given: Not Answered      Vital Signs (Current):   Vitals:    02/17/22 1358 02/18/22 1053   BP:  (!) 143/90   Pulse:  83   Resp:  14   Temp:  98 °F (36.7 °C)   TempSrc:  Temporal   SpO2:  99%   Weight: 140 lb (63.5 kg)    Height: 5' (1.524 m)                                               BP Readings from Last 3 Encounters:   02/18/22 (!) 143/90   02/13/22 (!) 147/97       NPO Status:   >8hrs                                                                                BMI:   Wt Readings from Last 3 Encounters:   02/17/22 140 lb (63.5 kg)   02/16/22 154 lb (69.9 kg)   02/13/22 154 lb 12.2 oz (70.2 kg)     Body mass index is 27.34 kg/m². CBC:   Lab Results   Component Value Date    WBC 6.5 08/20/2014    RBC 4.13 08/20/2014    HGB 11.2 08/20/2014    HCT 33.9 08/20/2014    MCV 82.1 08/20/2014    RDW 19.2 08/20/2014     08/20/2014       CMP:   Lab Results   Component Value Date     08/20/2014    K 4.0 08/20/2014     08/20/2014    CO2 22 08/20/2014    BUN 19 08/20/2014    CREATININE 1.9 08/20/2014    GFRAA 37 08/20/2014    LABGLOM 31 08/20/2014    GLUCOSE 99 08/20/2014    CALCIUM 9.5 08/20/2014       POC Tests: No results for input(s): POCGLU, POCNA, POCK, POCCL, POCBUN, POCHEMO, POCHCT in the last 72 hours.     Coags:   Lab Results   Component Value Date    PROTIME 10.0 08/20/2014    INR 0.93 08/20/2014    APTT 29.5 08/20/2014       HCG (If Applicable):   Lab Results   Component Value Date    PREGTESTUR Neg 06/12/2011        ABGs: No results found for: PHART, PO2ART, FNL8SDT, LGK3SVV, BEART, G7JJFEMJ     Type & Screen (If Applicable):  No results found for: LABABO, LABRH    Drug/Infectious Status (If Applicable):  No results found for: HIV, HEPCAB    COVID-19 Screening (If Applicable): No results

## 2022-02-18 NOTE — ANESTHESIA POSTPROCEDURE EVALUATION
Department of Anesthesiology  Postprocedure Note    Patient: Sidra Hunter  MRN: [id number]  YOB: 1970  Date of evaluation: 2/18/2022  Time:  6:03 PM     Procedure Summary     Date: 02/18/22 Room / Location: [address]    Anesthesia Start: 7266 Anesthesia Stop: 1250    Procedure: OPEN REDUCTION INTERNAL FIXATION RIGHT FIBULA (Right Leg Lower) Diagnosis: (RIGHT DISTAL FIBULA FRACTURE)    Surgeons: Susan Whitmore MD Responsible Provider: Khanh Calvillo MD    Anesthesia Type: MAC ASA Status: 3          Anesthesia Type: MAC    Emily Phase I: Emily Score: 9    Emily Phase II: Emily Score: 9    Last vitals: Reviewed and per EMR flowsheets.        Anesthesia Post Evaluation    Patient location during evaluation: PACU  Level of consciousness: awake and alert  Airway patency: patent  Nausea & Vomiting: no nausea and no vomiting  Complications: no  Cardiovascular status: blood pressure returned to baseline  Respiratory status: acceptable  Hydration status: euvolemic  Comments: Postoperative Anesthesia Note    Name:    Sidra Hunter  MRN:      [id number]    Patient Vitals in the past 12 hrs:  02/18/22 1604, BP:(!) 136/90, Pulse:78, Resp:16, SpO2:98 %  02/18/22 1455, BP:126/86, Temp:97.4 °F (36.3 °C), Temp src:Temporal, Pulse:86, Resp:16, SpO2:95 %  02/18/22 1442, Temp:98 °F (36.7 °C), Temp src:Temporal, Pulse:76, Resp:13, SpO2:93 %  02/18/22 1433, BP:(!) 151/93, Pulse:74, Resp:10, SpO2:97 %  02/18/22 1419, Pulse:70, Resp:10, SpO2:95 %  02/18/22 1418, Pulse:66, Resp:15, SpO2:91 %  02/18/22 1417, BP:(!) 142/95, Pulse:73, Resp:14, SpO2:(!) 88 %  02/18/22 1412, BP:(!) 145/89, Pulse:72, Resp:12, SpO2:97 %  02/18/22 1411, Pulse:79, Resp:23, SpO2:98 %  02/18/22 1410, Pulse:80, Resp:16, SpO2:91 %  02/18/22 1347, BP:(!) 133/99, Pulse:77, Resp:9, SpO2:94 %  02/18/22 1332, BP:(!) 120/93, Pulse:82, Resp:10, SpO2:93 %  02/18/22 1328, Pulse:84, Resp:12, SpO2:93 %  02/18/22 1319, Pulse:86, Resp:11, SpO2:95 %  02/18/22 1317, BP:(!) 120/96, Pulse:87, Resp:9, SpO2:92 %  02/18/22 1312, BP:(!) 125/91, Pulse:89, Resp:11, SpO2:96 %  02/18/22 1308, BP:(!) 122/91, Pulse:87, Resp:10, SpO2:96 %  02/18/22 1303, BP:(!) 116/91, Pulse:86, Resp:11, SpO2:98 %  02/18/22 1256, BP:120/81, Temp:97.3 °F (36.3 °C), Temp src:Temporal, Pulse:93, Resp:12, SpO2:100 %  02/18/22 1053, BP:(!) 143/90, Temp:98 °F (36.7 °C), Temp src:Temporal, Pulse:83, Resp:14, SpO2:99 %     LABS:    CBC  Lab Results       Component                Value               Date/Time                  WBC                      6.5                 08/20/2014 04:50 PM        HGB                      11.2 (L)            08/20/2014 04:50 PM        HCT                      33.9 (L)            08/20/2014 04:50 PM        PLT                      366                 08/20/2014 04:50 PM   RENAL  Lab Results       Component                Value               Date/Time                  NA                       138                 08/20/2014 04:50 PM        K                        4.0                 08/20/2014 04:50 PM        CL                       104                 08/20/2014 04:50 PM        CO2                      22                  08/20/2014 04:50 PM        BUN                      19                  08/20/2014 04:50 PM        CREATININE               1.9 (H)             08/20/2014 04:50 PM        GLUCOSE                  99                  08/20/2014 05:08 PM   COAGS  Lab Results       Component                Value               Date/Time                  PROTIME                  10.0                08/20/2014 04:50 PM        INR                      0.93                08/20/2014 04:50 PM        APTT                     29.5                08/20/2014 04:50 PM     Intake & Output:  @92GVJJ@    Nausea & Vomiting:  No    Level of Consciousness:  Awake    Pain Assessment:  Adequate analgesia    Anesthesia Complications:  No apparent anesthetic

## 2022-02-18 NOTE — H&P
Preoperative H&P Update    The patient's History and Physical in the medical record from 2/16/2022 was reviewed by me today. Past Medical History:   Diagnosis Date    Cerebral artery occlusion with cerebral infarction ([facility] [address] 2014    cerebral hemorrhage--right side weakness    History of anemia     Hypertension     Wears glasses      Past Surgical History:   Procedure Laterality Date    FRACTURE SURGERY Right     d/t stroke --tried to lengthen the ligaments to stop toes from curling     No current facility-administered medications on file prior to encounter. Current Outpatient Medications on File Prior to Encounter   Medication Sig Dispense Refill    hydroCHLOROthiazide (HYDRODIURIL) 25 MG tablet Take 25 mg by mouth daily      naproxen (NAPROSYN) 500 MG tablet Take 1 tablet by mouth 2 times daily as needed for Pain (swelling) 30 tablet 0    lisinopril (PRINIVIL;ZESTRIL) 20 MG tablet Take 10 mg by mouth daily . No Known Allergies   I reviewed the HPI, medications, allergies, reason for surgery, diagnosis and treatment plan and there has been no change. The patient was evaluated by me today. Physical exam findings for this update include:    Vitals:    02/18/22 1053   BP: (!) 143/90   Pulse: 83   Resp: 14   Temp: 98 °F (36.7 °C)   SpO2: 99%     Airway is intact  Chest: chest clear, no wheezing, rales, normal symmetric air entry, no tachypnea, retractions or cyanosis  Heart: regular rate and rhythm ; heart sounds normal  Findings on exam of the body region where surgery is to be performed include:  Right ankle lateral malleolus fracture.     Electronically signed by Oswaldo Rollins MD on 2/18/2022 at 11:41 AM

## 2022-02-18 NOTE — PROGRESS NOTES
From PACU. Very sleepy but oriented when awakened. C/o 9/10 surgical right leg pain when asked then she falls back to sleep. Vss. Dressing is clean dry intact. Toes are warm, move well, noe well. Vss. Ice pack behind right knee.

## 2022-02-19 NOTE — BRIEF OP NOTE
Brief Postoperative Note      Patient: Michelle Dunn  YOB: 1970  MRN: [id number]    Date of Procedure: 2/18/2022    Pre-Op Diagnosis: RIGHT DISTAL FIBULA FRACTURE    Post-Op Diagnosis: Same       Procedure(s):  OPEN REDUCTION INTERNAL FIXATION RIGHT FIBULA    Surgeon(s):  Farhan Jones MD    Assistant:  Surgical Assistant: Terry Grimaldo    Anesthesia: General    Estimated Blood Loss (mL): Minimal    Complications: None    Specimens:   * No specimens in log *    Implants:  Implant Name Type Inv. Item Serial No.  Lot No. LRB No. Used Action   PLATE BNE [id number] 4 H R LAT DST PERIARTC FIBULAR S STL STEVE - [id number]  PLATE BNE [id number] 4 H R LAT DST PERIARTC FIBULAR S STL STEVE  ELY BIOMET TRAUMA-WD  Right 1 Implanted   SCREW BNE L16MM DIA2. 7MM LNG NATHANAEL FT ANK S STL ST - [id number]  SCREW BNE L16MM DIA2. 7MM LNG NATHANAEL FT ANK S STL ST  ELY BIOMET TRAUMA-WD  Right 1 Implanted   SCREW BNE L14MM DIA3. 5MM HD DIA2.7MM NATHANAEL PERIARTC S STL ST - [id number]  SCREW BNE L14MM DIA3. 5MM HD DIA2.7MM NATHANAEL PERIARTC S STL ST  ELY BIOMET TRAUMA-WD  Right 1 Implanted   SCREW BNE L16MM DIA3. 5MM HD DIA2.7MM PERIARTC NATHANAEL S STL ST - [id number]  SCREW BNE L16MM DIA3. 5MM HD DIA2.7MM PERIARTC NATHANAEL S STL ST  ELY BIOMET TRAUMA-WD  Right 1 Implanted   SCREW BNE L14MM DIA2.7MM HEX HD DIA2.5MM CANC BIODUR 108C - [id number]  SCREW BNE L14MM DIA2.7MM HEX HD DIA2.5MM CANC BIODUR 108C  ELY BIOMET TRAUMA-WD  Right 1 Implanted   SCREW BNE L16MM DIA2.7MM HEX HD DIA2.5MM CANC BIODUR 108C - [id number]  SCREW BNE L16MM DIA2.7MM HEX HD DIA2.5MM CANC BIODUR 108C  ELY BIOMET TRAUMA-WD  Right 1 Implanted   SCREW BNE L18MM DIA2.7MM HEX HD DIA2.5MM CANC BIODUR 108C - [id number]  SCREW BNE L18MM DIA2.7MM HEX HD DIA2.5MM CANC BIODUR 108C  ELY BIOMET TRAUMA-WD  Right 3 Implanted         Drains: * No LDAs found *    Findings: Same.     Electronically signed by Farhan Jones MD on 2/18/2022 at 7:02 PM

## 2022-02-19 NOTE — OP NOTE
[address]           [address]                                OPERATIVE REPORT    PATIENT NAME: Alyx Portillo                   :        1970  MED REC NO:   [id number]                          ROOM:  ACCOUNT NO:   [de-identified]                           ADMIT DATE: 2022  PROVIDER:     Dominique Boucher MD      DATE OF PROCEDURE:  2022    PREOPERATIVE DIAGNOSIS:  Right ankle lateral malleolus displaced  fracture. POSTOPERATIVE DIAGNOSIS:  Right ankle lateral malleolus displaced  fracture. OPERATION PERFORMED:  Open treatment of right ankle lateral malleolus  fracture with open reduction and internal fixation. SURGEON:  Dominique Boucher MD    ASSISTANTS:  Katherin Walter, Surgical Assistant. ANESTHESIA:  General anesthesia. ESTIMATED BLOOD LOSS:  Minimal.    COMPLICATIONS:  None. TOURNIQUET:  Right upper calf, 250 mmHg. IMPLANT USED:  Sofía distal fibula locking plate. INDICATIONS:  This is a 57-year-old -American female who  sustained a twisting injury to her right ankle when she was walking and  slipped on ice. She was seen initially at [facility] ER. She sustained a  mildly to moderately displaced lateral malleolus fracture. All risks,  benefits and alternatives were discussed with the patient including  nonoperative treatment. She elected to proceed with surgical fixation. OPERATIVE PROCEDURE:  The patient's right ankle was marked. She  received 2 gm of Ancef IV preoperatively. The patient was then brought  to the operating room, underwent general anesthesia. A well-padded  tourniquet was placed, right upper calf. The right lower extremity was  then prepped and draped in regular sterile routine fashion. A time-out  was called to confirm the patient's name, site and procedure. Esmarch was used for exsanguination. Tourniquet was inflated to 250  mmHg.   A lateral incision was made over the distal fibula. The fracture  was exposed, was found to be moderately displaced. We were able to  clean up the fracture site. We were able to reduce it anatomically. While maintaining the reduction, we put one lag screw from anterior to  posterior. That stabilized the fracture provisionally. We then put the  plate in the appropriate position. We put one screw in the oblong hole  and then we locked it in place with four distal 2.7 locking screws. We  added one more cortical screw in the shaft. Overall, we were satisfied  with the anatomic reduction and position of all the screws. At this point, we let the tourniquet down and hemostasis was secured. We irrigated the incision copiously with normal saline. We closed the  deep layer with 2-0 Vicryl, subcu with 3-0 Vicryl, and the skin with 4-0  Monocryl. Steri-Strips were then applied. Dressing was then applied in  the form of Xeroform, 4x4, sterile Webril, and a splint was applied. The patient tolerated the procedure well and was taken to the recovery  in stable condition. POSTOPERATIVE PLAN:  The patient will be discharged home. She will be  nonweightbearing for the first two weeks, but after that, we can start  gradual weightbearing as tolerated in a boot for the following four to  six weeks.         Tata Arana MD    D: 02/18/2022 19:11:46       T: 02/18/2022 23:52:18     /[email or web address]  Job#: [id number]     Doc#: [id number]    CC:

## 2022-02-21 NOTE — FLOWSHEET NOTE
Went over all discharge instructions with patient over the phone. States son just dropped her off and she doesn't know what she is supposed to do. Wondered if someone was coming to her house to see her and change her dressing. Instructed she has a post op appointment with Corine Adams. [facility].

## 2022-03-09 ENCOUNTER — OFFICE VISIT (OUTPATIENT)
Dept: ORTHOPEDIC SURGERY | Age: 52
End: 2022-03-09
Payer: MEDICARE

## 2022-03-09 VITALS — HEIGHT: 60 IN | WEIGHT: 140 LBS | BODY MASS INDEX: 27.48 KG/M2

## 2022-03-09 DIAGNOSIS — S82.831A CLOSED FRACTURE OF DISTAL END OF RIGHT FIBULA, UNSPECIFIED FRACTURE MORPHOLOGY, INITIAL ENCOUNTER: Primary | ICD-10-CM

## 2022-03-09 PROCEDURE — L4360 PNEUMAT WALKING BOOT PRE CST: HCPCS | Performed by: NURSE PRACTITIONER

## 2022-03-09 PROCEDURE — APPNB15 APP NON BILLABLE TIME 0-15 MINS: Performed by: NURSE PRACTITIONER

## 2022-03-09 PROCEDURE — 99024 POSTOP FOLLOW-UP VISIT: CPT | Performed by: NURSE PRACTITIONER

## 2022-03-10 NOTE — PROGRESS NOTES
DIAGNOSIS:  Right ankle lateral malleolus fracture, status post ORIF. DATE OF SURGERY:  02/18/222. HISTORY OF PRESENT ILLNESS:  Ms. Pawel Calvert 50 y.o.  female who came in today for 2 weeks postoperative visit. The patient denies any significant pain in the left ankle. Rates pain a 0-1/10 VAS mild, aching, intermittent and are improving. Aggravating factors movement. Alleviating factors elevation and rest. She has been in a splint, and non WB. No numbness or tingling sensation. No fever or Chills. Denies smoking. PHYSICAL EXAMINATION:  The incision healing well. No signs of any erythema or drainage, minimal swelling. She has no pain with the active or passive range of motion of the right ankle, but decrease ROM. She has intact sensation distally, and she is neurovascularly intact. IMAGING:  Three views right ankle taken today in the office showed anatomic alignment of the fracture, plate and screws in good position, no loosening. Ankle mortise is well centered. IMPRESSION:  2 weeks out from right ankle lateral malleolus ORIF and doing very well. PLAN: She placed in a boot, and can start gradual WB. ASA for DVT prophylaxis. I have told the patient to work on ROM, The patient will come back for a follow up in 6 weeks. At that time, we will take 3 views of the right ankle standing. Procedures    Rebekah / Manuel Steve Tall Walking Boot     Patient was prescribed a Tall Walking Boot. The right ankle will require stabilization / immobilization from this semi-rigid / rigid orthosis to improve their function. The orthosis will assist in protecting the affected area, provide functional support and facilitate healing. Patient was instructed to progress ambulation weight bearing as tolerated in the device. The patient was educated and fit by a healthcare professional with expert knowledge and specialization in brace application while under the direct supervision of the physician.

## 2022-04-08 ENCOUNTER — OFFICE VISIT (OUTPATIENT)
Dept: ORTHOPEDIC SURGERY | Age: 52
End: 2022-04-08

## 2022-04-08 VITALS — WEIGHT: 151.2 LBS | BODY MASS INDEX: 29.68 KG/M2 | HEIGHT: 60 IN

## 2022-04-08 DIAGNOSIS — S82.831A CLOSED FRACTURE OF DISTAL END OF RIGHT FIBULA, UNSPECIFIED FRACTURE MORPHOLOGY, INITIAL ENCOUNTER: Primary | ICD-10-CM

## 2022-04-08 PROCEDURE — 99024 POSTOP FOLLOW-UP VISIT: CPT | Performed by: NURSE PRACTITIONER

## 2022-04-08 PROCEDURE — APPNB15 APP NON BILLABLE TIME 0-15 MINS: Performed by: NURSE PRACTITIONER

## 2022-04-08 NOTE — PROGRESS NOTES
DIAGNOSIS:  Right ankle lateral malleolus fracture, status post ORIF. DATE OF SURGERY:  02/18/222. HISTORY OF PRESENT ILLNESS:  Ms. Olive De La Cruz 50 y.o.  female who came in today for 8 weeks postoperative visit. The patient denies any significant pain in the left ankle. Rates pain a 5/10 VAS mild, aching, intermittent and are improving. Aggravating factors increased use. Alleviating factors rest. She has been in a boot, and WB. She stopped wearing her boot last week and is doing well. No numbness or tingling sensation. No fever or Chills. Denies smoking. PHYSICAL EXAMINATION:  The incision healing well. No signs of any erythema or drainage, minimal swelling. She has no pain with the active or passive range of motion of the right ankle, but decrease ROM. She has intact sensation distally, and she is neurovascularly intact. IMAGING:  Three views right ankle taken today in the office showed anatomic alignment of the fracture, plate and screws in good position, no loosening. Ankle mortise is well centered. IMPRESSION:  8 weeks out from right ankle lateral malleolus ORIF and doing very well. PLAN:  She will be WBAT in the boot, and start aggressive ROM and peroneal strengthening exercise. Off the boot in this week. No heavy impact activities. I discussed with the patient that I think that she would really benefit from a course of physical therapy for further strengthening and stretching. An Rx for physical therapy was given to the patient. The patient will come back for a follow up in 6 weeks. At that time, we will take 3 views of the right ankle standing.       Cam Castano, LESLIE - CNP

## 2022-04-19 ENCOUNTER — HOSPITAL ENCOUNTER (OUTPATIENT)
Dept: PHYSICAL THERAPY | Age: 52
Setting detail: THERAPIES SERIES
Discharge: HOME | End: 2022-04-19

## 2022-04-19 NOTE — FLOWSHEET NOTE
[address] [facility] [address] [place]. [place], [address] [address]  Phone: [phone]   Fax:     [phone]    Physical Therapy  Cancellation/No-show Note  Patient Name:  Cherly Severin  :  1970   Date:  2022  Cancelled visits to date: 0  No-shows to date: 1 NP    Patient status for today's appointment patient:  []  Cancelled  []  Rescheduled appointment  [x]  No-show     Reason given by patient:  []  Patient ill  []  Conflicting appointment  []  No transportation    []  Conflict with work  [x]  No reason given  []  Other:     Comments:      Phone call information:   []  Phone call made today to patient at _ time at number provided:      []  Patient answered, conversation as follows:    []  Patient did not answer, message left as follows:  [x]  Phone call not made today.      Electronically signed by:  Felipe Hollins, PT

## 2023-05-27 ENCOUNTER — APPOINTMENT (OUTPATIENT)
Dept: GENERAL RADIOLOGY | Age: 53
End: 2023-05-27
Payer: MEDICARE

## 2023-05-27 ENCOUNTER — APPOINTMENT (OUTPATIENT)
Dept: CT IMAGING | Age: 53
End: 2023-05-27
Payer: MEDICARE

## 2023-05-27 ENCOUNTER — HOSPITAL ENCOUNTER (EMERGENCY)
Age: 53
Discharge: HOME | End: 2023-05-27
Payer: MEDICARE

## 2023-05-27 VITALS
SYSTOLIC BLOOD PRESSURE: 136 MMHG | WEIGHT: 150.35 LBS | TEMPERATURE: 97.5 F | HEART RATE: 83 BPM | OXYGEN SATURATION: 100 % | RESPIRATION RATE: 16 BRPM | DIASTOLIC BLOOD PRESSURE: 83 MMHG | HEIGHT: 60 IN | BODY MASS INDEX: 29.52 KG/M2

## 2023-05-27 DIAGNOSIS — R93.89 ABNORMAL CT SCAN: Primary | ICD-10-CM

## 2023-05-27 DIAGNOSIS — M76.62 ACHILLES TENDINITIS OF LEFT LOWER EXTREMITY: ICD-10-CM

## 2023-05-27 PROCEDURE — 6370000000 HC RX 637 (ALT 250 FOR IP): Performed by: NURSE PRACTITIONER

## 2023-05-27 PROCEDURE — 73630 X-RAY EXAM OF FOOT: CPT

## 2023-05-27 PROCEDURE — 99284 EMERGENCY DEPT VISIT MOD MDM: CPT

## 2023-05-27 PROCEDURE — 73700 CT LOWER EXTREMITY W/O DYE: CPT

## 2023-05-27 RX ORDER — ACETAMINOPHEN 500 MG
1000 TABLET ORAL ONCE
Status: COMPLETED | OUTPATIENT
Start: 2023-05-27 | End: 2023-05-27

## 2023-05-27 RX ORDER — ACETAMINOPHEN 500 MG
500 TABLET ORAL 4 TIMES DAILY PRN
Qty: 28 TABLET | Refills: 0 | Status: SHIPPED | OUTPATIENT
Start: 2023-05-27 | End: 2023-06-03

## 2023-05-27 RX ADMIN — ACETAMINOPHEN 1000 MG: 500 TABLET ORAL at 18:03

## 2023-05-27 ASSESSMENT — PAIN SCALES - GENERAL
PAINLEVEL_OUTOF10: 10
PAINLEVEL_OUTOF10: 4

## 2023-05-27 ASSESSMENT — PAIN DESCRIPTION - PAIN TYPE: TYPE: ACUTE PAIN

## 2023-05-27 ASSESSMENT — PAIN - FUNCTIONAL ASSESSMENT
PAIN_FUNCTIONAL_ASSESSMENT: 0-10
PAIN_FUNCTIONAL_ASSESSMENT: 0-10

## 2023-05-27 ASSESSMENT — PAIN DESCRIPTION - ORIENTATION: ORIENTATION: LEFT

## 2023-05-27 ASSESSMENT — PAIN DESCRIPTION - LOCATION: LOCATION: FOOT

## 2023-05-27 ASSESSMENT — PAIN DESCRIPTION - FREQUENCY: FREQUENCY: CONTINUOUS

## 2023-05-27 NOTE — DISCHARGE INSTRUCTIONS
Please follow-up with the orthopedic specialist or the foot doctor/podiatrist by calling the provided number to schedule an appointment for evaluation. Utilize RICE and the walker boot. Medication as prescribed. Also, follow-up with your primary care doctor for reevaluation in next 1-2 days.

## 2023-05-27 NOTE — ED TRIAGE NOTES
Pt arrives via hospital wheelchair for eval of left foot pain denies injury. Pt is a/xo4, rsp nonlabored and pwd.

## 2023-05-30 ASSESSMENT — ENCOUNTER SYMPTOMS
SHORTNESS OF BREATH: 0
NAUSEA: 0
DIARRHEA: 0
COLOR CHANGE: 0
BACK PAIN: 0
COUGH: 0
VOMITING: 0

## 2023-07-19 ENCOUNTER — OFFICE VISIT (OUTPATIENT)
Dept: ORTHOPEDIC SURGERY | Age: 53
End: 2023-07-19

## 2023-07-19 VITALS — WEIGHT: 150 LBS | BODY MASS INDEX: 29.45 KG/M2 | HEIGHT: 60 IN

## 2023-07-19 DIAGNOSIS — M21.42 PES PLANUS OF BOTH FEET: Primary | ICD-10-CM

## 2023-07-19 DIAGNOSIS — M25.471 RIGHT ANKLE SWELLING: ICD-10-CM

## 2023-07-19 DIAGNOSIS — S82.831A CLOSED FRACTURE OF DISTAL END OF RIGHT FIBULA, UNSPECIFIED FRACTURE MORPHOLOGY, INITIAL ENCOUNTER: ICD-10-CM

## 2023-07-19 DIAGNOSIS — M21.41 PES PLANUS OF BOTH FEET: Primary | ICD-10-CM

## 2023-07-19 PROBLEM — M21.40 FLAT FOOT: Status: ACTIVE | Noted: 2023-07-19

## 2025-03-05 ENCOUNTER — APPOINTMENT (OUTPATIENT)
Dept: GENERAL RADIOLOGY | Age: 55
DRG: 177 | End: 2025-03-05
Payer: MEDICARE

## 2025-03-05 ENCOUNTER — HOSPITAL ENCOUNTER (INPATIENT)
Age: 55
LOS: 8 days | Discharge: ANOTHER HOSPITAL | DRG: 177 | End: 2025-03-13
Attending: STUDENT IN AN ORGANIZED HEALTH CARE EDUCATION/TRAINING PROGRAM | Admitting: STUDENT IN AN ORGANIZED HEALTH CARE EDUCATION/TRAINING PROGRAM
Payer: MEDICARE

## 2025-03-05 DIAGNOSIS — R06.00 DYSPNEA, UNSPECIFIED TYPE: ICD-10-CM

## 2025-03-05 DIAGNOSIS — Z99.2 ESRD (END STAGE RENAL DISEASE) ON DIALYSIS (HCC): ICD-10-CM

## 2025-03-05 DIAGNOSIS — R06.02 SHORTNESS OF BREATH: ICD-10-CM

## 2025-03-05 DIAGNOSIS — R79.89 ELEVATED BRAIN NATRIURETIC PEPTIDE (BNP) LEVEL: ICD-10-CM

## 2025-03-05 DIAGNOSIS — N18.6 ESRD (END STAGE RENAL DISEASE) ON DIALYSIS (HCC): ICD-10-CM

## 2025-03-05 DIAGNOSIS — Z98.890 STATUS POST THORACENTESIS: ICD-10-CM

## 2025-03-05 DIAGNOSIS — J18.9 PNEUMONIA DUE TO INFECTIOUS ORGANISM, UNSPECIFIED LATERALITY, UNSPECIFIED PART OF LUNG: ICD-10-CM

## 2025-03-05 DIAGNOSIS — J90 PLEURAL EFFUSION: Primary | ICD-10-CM

## 2025-03-05 LAB
ALBUMIN SERPL-MCNC: 3.7 G/DL (ref 3.4–5)
ALBUMIN/GLOB SERPL: 1.1 {RATIO} (ref 1.1–2.2)
ALP SERPL-CCNC: 97 U/L (ref 40–129)
ALT SERPL-CCNC: 9 U/L (ref 10–40)
ANION GAP SERPL CALCULATED.3IONS-SCNC: 13 MMOL/L (ref 3–16)
APTT BLD: 32.1 SEC (ref 22.1–36.4)
AST SERPL-CCNC: 19 U/L (ref 15–37)
BACTERIA URNS QL MICRO: ABNORMAL /HPF
BASOPHILS # BLD: 0 K/UL (ref 0–0.2)
BASOPHILS NFR BLD: 0.7 %
BILIRUB SERPL-MCNC: 0.3 MG/DL (ref 0–1)
BILIRUB UR QL STRIP.AUTO: NEGATIVE
BUN SERPL-MCNC: 47 MG/DL (ref 7–20)
CALCIUM SERPL-MCNC: 8.5 MG/DL (ref 8.3–10.6)
CHLORIDE SERPL-SCNC: 106 MMOL/L (ref 99–110)
CLARITY UR: CLEAR
CO2 SERPL-SCNC: 22 MMOL/L (ref 21–32)
COLOR UR: YELLOW
CREAT SERPL-MCNC: 9.1 MG/DL (ref 0.6–1.1)
D-DIMER QUANTITATIVE: 0.64 UG/ML FEU (ref 0–0.6)
DEPRECATED RDW RBC AUTO: 19.2 % (ref 12.4–15.4)
EOSINOPHIL # BLD: 0.1 K/UL (ref 0–0.6)
EOSINOPHIL NFR BLD: 3.7 %
EPI CELLS #/AREA URNS AUTO: 9 /HPF (ref 0–5)
FLUAV RNA RESP QL NAA+PROBE: NOT DETECTED
FLUBV RNA RESP QL NAA+PROBE: NOT DETECTED
GFR SERPLBLD CREATININE-BSD FMLA CKD-EPI: 5 ML/MIN/{1.73_M2}
GLUCOSE SERPL-MCNC: 88 MG/DL (ref 70–99)
GLUCOSE UR STRIP.AUTO-MCNC: NEGATIVE MG/DL
HCG SERPL QL: NEGATIVE
HCT VFR BLD AUTO: 33 % (ref 36–48)
HGB BLD-MCNC: 10.9 G/DL (ref 12–16)
HGB UR QL STRIP.AUTO: NEGATIVE
HYALINE CASTS #/AREA URNS AUTO: 0 /LPF (ref 0–8)
INR PPP: 1.05 (ref 0.85–1.15)
KETONES UR STRIP.AUTO-MCNC: NEGATIVE MG/DL
LEUKOCYTE ESTERASE UR QL STRIP.AUTO: NEGATIVE
LYMPHOCYTES # BLD: 1.1 K/UL (ref 1–5.1)
LYMPHOCYTES NFR BLD: 26.9 %
MCH RBC QN AUTO: 28.1 PG (ref 26–34)
MCHC RBC AUTO-ENTMCNC: 33 G/DL (ref 31–36)
MCV RBC AUTO: 85.1 FL (ref 80–100)
MONOCYTES # BLD: 0.4 K/UL (ref 0–1.3)
MONOCYTES NFR BLD: 10.2 %
NEUTROPHILS # BLD: 2.3 K/UL (ref 1.7–7.7)
NEUTROPHILS NFR BLD: 58.5 %
NITRITE UR QL STRIP.AUTO: NEGATIVE
NT-PROBNP SERPL-MCNC: 1021 PG/ML (ref 0–124)
PH UR STRIP.AUTO: 8 [PH] (ref 5–8)
PLATELET # BLD AUTO: 249 K/UL (ref 135–450)
PMV BLD AUTO: 5.8 FL (ref 5–10.5)
POTASSIUM SERPL-SCNC: 4.1 MMOL/L (ref 3.5–5.1)
PROCALCITONIN SERPL IA-MCNC: 0.33 NG/ML (ref 0–0.15)
PROT SERPL-MCNC: 7.2 G/DL (ref 6.4–8.2)
PROT UR STRIP.AUTO-MCNC: 300 MG/DL
PROTHROMBIN TIME: 13.9 SEC (ref 11.9–14.9)
RBC # BLD AUTO: 3.87 M/UL (ref 4–5.2)
RBC CLUMPS #/AREA URNS AUTO: 0 /HPF (ref 0–4)
SARS-COV-2 RNA RESP QL NAA+PROBE: NOT DETECTED
SODIUM SERPL-SCNC: 141 MMOL/L (ref 136–145)
SP GR UR STRIP.AUTO: 1.01 (ref 1–1.03)
TROPONIN, HIGH SENSITIVITY: 34 NG/L (ref 0–14)
TROPONIN, HIGH SENSITIVITY: 35 NG/L (ref 0–14)
UA COMPLETE W REFLEX CULTURE PNL UR: NORMAL
UA DIPSTICK W REFLEX MICRO PNL UR: YES
URN SPEC COLLECT METH UR: NORMAL
UROBILINOGEN UR STRIP-ACNC: 0.2 E.U./DL
WBC # BLD AUTO: 4 K/UL (ref 4–11)
WBC #/AREA URNS AUTO: 3 /HPF (ref 0–5)

## 2025-03-05 PROCEDURE — 2580000003 HC RX 258: Performed by: PHYSICIAN ASSISTANT

## 2025-03-05 PROCEDURE — 93005 ELECTROCARDIOGRAM TRACING: CPT | Performed by: STUDENT IN AN ORGANIZED HEALTH CARE EDUCATION/TRAINING PROGRAM

## 2025-03-05 PROCEDURE — 2060000000 HC ICU INTERMEDIATE R&B

## 2025-03-05 PROCEDURE — 96375 TX/PRO/DX INJ NEW DRUG ADDON: CPT

## 2025-03-05 PROCEDURE — 80053 COMPREHEN METABOLIC PANEL: CPT

## 2025-03-05 PROCEDURE — 84703 CHORIONIC GONADOTROPIN ASSAY: CPT

## 2025-03-05 PROCEDURE — 6370000000 HC RX 637 (ALT 250 FOR IP): Performed by: PHYSICIAN ASSISTANT

## 2025-03-05 PROCEDURE — 2500000003 HC RX 250 WO HCPCS: Performed by: PHYSICIAN ASSISTANT

## 2025-03-05 PROCEDURE — 85730 THROMBOPLASTIN TIME PARTIAL: CPT

## 2025-03-05 PROCEDURE — 71046 X-RAY EXAM CHEST 2 VIEWS: CPT

## 2025-03-05 PROCEDURE — 6360000002 HC RX W HCPCS

## 2025-03-05 PROCEDURE — 87636 SARSCOV2 & INF A&B AMP PRB: CPT

## 2025-03-05 PROCEDURE — 83880 ASSAY OF NATRIURETIC PEPTIDE: CPT

## 2025-03-05 PROCEDURE — 84145 PROCALCITONIN (PCT): CPT

## 2025-03-05 PROCEDURE — 96365 THER/PROPH/DIAG IV INF INIT: CPT

## 2025-03-05 PROCEDURE — 84484 ASSAY OF TROPONIN QUANT: CPT

## 2025-03-05 PROCEDURE — 85610 PROTHROMBIN TIME: CPT

## 2025-03-05 PROCEDURE — 6360000002 HC RX W HCPCS: Performed by: PHYSICIAN ASSISTANT

## 2025-03-05 PROCEDURE — 85379 FIBRIN DEGRADATION QUANT: CPT

## 2025-03-05 PROCEDURE — 85025 COMPLETE CBC W/AUTO DIFF WBC: CPT

## 2025-03-05 PROCEDURE — 99285 EMERGENCY DEPT VISIT HI MDM: CPT

## 2025-03-05 PROCEDURE — 81001 URINALYSIS AUTO W/SCOPE: CPT

## 2025-03-05 RX ORDER — HEPARIN SODIUM 5000 [USP'U]/ML
5000 INJECTION, SOLUTION INTRAVENOUS; SUBCUTANEOUS EVERY 8 HOURS SCHEDULED
Status: DISCONTINUED | OUTPATIENT
Start: 2025-03-06 | End: 2025-03-13 | Stop reason: HOSPADM

## 2025-03-05 RX ORDER — SODIUM CHLORIDE 0.9 % (FLUSH) 0.9 %
5-40 SYRINGE (ML) INJECTION PRN
Status: DISCONTINUED | OUTPATIENT
Start: 2025-03-05 | End: 2025-03-13 | Stop reason: HOSPADM

## 2025-03-05 RX ORDER — SODIUM CHLORIDE 0.9 % (FLUSH) 0.9 %
5-40 SYRINGE (ML) INJECTION EVERY 12 HOURS SCHEDULED
Status: DISCONTINUED | OUTPATIENT
Start: 2025-03-05 | End: 2025-03-13 | Stop reason: HOSPADM

## 2025-03-05 RX ORDER — AMLODIPINE BESYLATE 10 MG/1
10 TABLET ORAL DAILY
COMMUNITY

## 2025-03-05 RX ORDER — CARVEDILOL 12.5 MG/1
12.5 TABLET ORAL 2 TIMES DAILY WITH MEALS
COMMUNITY

## 2025-03-05 RX ORDER — SENNOSIDES A AND B 8.6 MG/1
1 TABLET, FILM COATED ORAL DAILY PRN
Status: DISCONTINUED | OUTPATIENT
Start: 2025-03-05 | End: 2025-03-13 | Stop reason: HOSPADM

## 2025-03-05 RX ORDER — LACTOBACILLUS RHAMNOSUS GG 10B CELL
1 CAPSULE ORAL 2 TIMES DAILY WITH MEALS
Status: DISCONTINUED | OUTPATIENT
Start: 2025-03-06 | End: 2025-03-13 | Stop reason: HOSPADM

## 2025-03-05 RX ORDER — ONDANSETRON 2 MG/ML
4 INJECTION INTRAMUSCULAR; INTRAVENOUS EVERY 6 HOURS PRN
Status: DISCONTINUED | OUTPATIENT
Start: 2025-03-05 | End: 2025-03-13 | Stop reason: HOSPADM

## 2025-03-05 RX ORDER — SODIUM CHLORIDE 9 MG/ML
INJECTION, SOLUTION INTRAVENOUS PRN
Status: DISCONTINUED | OUTPATIENT
Start: 2025-03-05 | End: 2025-03-13 | Stop reason: HOSPADM

## 2025-03-05 RX ORDER — ONDANSETRON 2 MG/ML
INJECTION INTRAMUSCULAR; INTRAVENOUS
Status: COMPLETED
Start: 2025-03-05 | End: 2025-03-05

## 2025-03-05 RX ORDER — ACETAMINOPHEN 650 MG/1
650 SUPPOSITORY RECTAL EVERY 6 HOURS PRN
Status: DISCONTINUED | OUTPATIENT
Start: 2025-03-05 | End: 2025-03-13 | Stop reason: HOSPADM

## 2025-03-05 RX ORDER — ONDANSETRON 2 MG/ML
4 INJECTION INTRAMUSCULAR; INTRAVENOUS ONCE
Status: COMPLETED | OUTPATIENT
Start: 2025-03-05 | End: 2025-03-05

## 2025-03-05 RX ORDER — ACETAMINOPHEN 325 MG/1
650 TABLET ORAL EVERY 6 HOURS PRN
Status: DISCONTINUED | OUTPATIENT
Start: 2025-03-05 | End: 2025-03-13 | Stop reason: HOSPADM

## 2025-03-05 RX ORDER — AMLODIPINE BESYLATE 5 MG/1
10 TABLET ORAL DAILY
Status: DISCONTINUED | OUTPATIENT
Start: 2025-03-06 | End: 2025-03-13 | Stop reason: HOSPADM

## 2025-03-05 RX ADMIN — ONDANSETRON 4 MG: 2 INJECTION INTRAMUSCULAR; INTRAVENOUS at 20:38

## 2025-03-05 RX ADMIN — WATER 1000 MG: 1 INJECTION INTRAMUSCULAR; INTRAVENOUS; SUBCUTANEOUS at 20:21

## 2025-03-05 RX ADMIN — ACETAMINOPHEN 650 MG: 325 TABLET ORAL at 22:51

## 2025-03-05 RX ADMIN — ONDANSETRON 4 MG: 2 INJECTION, SOLUTION INTRAMUSCULAR; INTRAVENOUS at 20:38

## 2025-03-05 RX ADMIN — AZITHROMYCIN MONOHYDRATE 500 MG: 500 INJECTION, POWDER, LYOPHILIZED, FOR SOLUTION INTRAVENOUS at 20:25

## 2025-03-05 ASSESSMENT — LIFESTYLE VARIABLES
HOW OFTEN DO YOU HAVE A DRINK CONTAINING ALCOHOL: NEVER
HOW MANY STANDARD DRINKS CONTAINING ALCOHOL DO YOU HAVE ON A TYPICAL DAY: PATIENT DOES NOT DRINK

## 2025-03-05 ASSESSMENT — PAIN - FUNCTIONAL ASSESSMENT
PAIN_FUNCTIONAL_ASSESSMENT: NONE - DENIES PAIN
PAIN_FUNCTIONAL_ASSESSMENT: 0-10

## 2025-03-05 ASSESSMENT — PAIN SCALES - GENERAL
PAINLEVEL_OUTOF10: 5
PAINLEVEL_OUTOF10: 7

## 2025-03-06 ENCOUNTER — APPOINTMENT (OUTPATIENT)
Dept: INTERVENTIONAL RADIOLOGY/VASCULAR | Age: 55
DRG: 177 | End: 2025-03-06
Payer: MEDICARE

## 2025-03-06 ENCOUNTER — APPOINTMENT (OUTPATIENT)
Dept: GENERAL RADIOLOGY | Age: 55
DRG: 177 | End: 2025-03-06
Payer: MEDICARE

## 2025-03-06 ENCOUNTER — APPOINTMENT (OUTPATIENT)
Age: 55
DRG: 177 | End: 2025-03-06
Attending: INTERNAL MEDICINE
Payer: MEDICARE

## 2025-03-06 LAB
ANION GAP SERPL CALCULATED.3IONS-SCNC: 15 MMOL/L (ref 3–16)
APPEARANCE FLUID: CLEAR
BASOPHILS # BLD: 0 K/UL (ref 0–0.2)
BASOPHILS NFR BLD: 0.7 %
BASOPHILS NFR FLD MANUAL: 1 %
BDY FLUID QUALITY: NORMAL
BUN SERPL-MCNC: 47 MG/DL (ref 7–20)
CALCIUM SERPL-MCNC: 7.6 MG/DL (ref 8.3–10.6)
CELL COUNT FLUID TYPE: NORMAL
CHLORIDE SERPL-SCNC: 110 MMOL/L (ref 99–110)
CO2 SERPL-SCNC: 19 MMOL/L (ref 21–32)
COLOR FLUID: COLORLESS
CREAT SERPL-MCNC: 9.5 MG/DL (ref 0.6–1.1)
DEPRECATED RDW RBC AUTO: 19 % (ref 12.4–15.4)
ECHO AO ASC DIAM: 3.3 CM
ECHO AO ASCENDING AORTA INDEX: 1.98 CM/M2
ECHO AO ROOT DIAM: 3.1 CM
ECHO AO ROOT INDEX: 1.86 CM/M2
ECHO AV AREA PEAK VELOCITY: 2 CM2
ECHO AV AREA VTI: 2 CM2
ECHO AV AREA/BSA PEAK VELOCITY: 1.2 CM2/M2
ECHO AV AREA/BSA VTI: 1.2 CM2/M2
ECHO AV MEAN GRADIENT: 6 MMHG
ECHO AV MEAN VELOCITY: 1.2 M/S
ECHO AV PEAK GRADIENT: 13 MMHG
ECHO AV PEAK VELOCITY: 1.8 M/S
ECHO AV VELOCITY RATIO: 0.61
ECHO AV VTI: 40.2 CM
ECHO BSA: 1.71 M2
ECHO IVC PROX: 1 CM
ECHO LA AREA 2C: 21.9 CM2
ECHO LA AREA 4C: 17.8 CM2
ECHO LA DIAMETER INDEX: 1.14 CM/M2
ECHO LA DIAMETER: 1.9 CM
ECHO LA MAJOR AXIS: 6 CM
ECHO LA MINOR AXIS: 6.3 CM
ECHO LA TO AORTIC ROOT RATIO: 0.61
ECHO LA VOL BP: 53 ML (ref 22–52)
ECHO LA VOL MOD A2C: 61 ML (ref 22–52)
ECHO LA VOL MOD A4C: 45 ML (ref 22–52)
ECHO LA VOL/BSA BIPLANE: 32 ML/M2 (ref 16–34)
ECHO LA VOLUME INDEX MOD A2C: 37 ML/M2 (ref 16–34)
ECHO LA VOLUME INDEX MOD A4C: 27 ML/M2 (ref 16–34)
ECHO LV E' LATERAL VELOCITY: 9.57 CM/S
ECHO LV E' SEPTAL VELOCITY: 7.4 CM/S
ECHO LV EDV A2C: 107 ML
ECHO LV EDV A4C: 110 ML
ECHO LV EDV INDEX A4C: 66 ML/M2
ECHO LV EDV NDEX A2C: 64 ML/M2
ECHO LV EF PHYSICIAN: 55 %
ECHO LV EJECTION FRACTION A2C: 65 %
ECHO LV EJECTION FRACTION A4C: 44 %
ECHO LV EJECTION FRACTION BIPLANE: 55 % (ref 55–100)
ECHO LV ESV A2C: 38 ML
ECHO LV ESV A4C: 62 ML
ECHO LV ESV INDEX A2C: 23 ML/M2
ECHO LV ESV INDEX A4C: 37 ML/M2
ECHO LV FRACTIONAL SHORTENING: 34 % (ref 28–44)
ECHO LV INTERNAL DIMENSION DIASTOLE INDEX: 2.46 CM/M2
ECHO LV INTERNAL DIMENSION DIASTOLIC: 4.1 CM (ref 3.9–5.3)
ECHO LV INTERNAL DIMENSION SYSTOLIC INDEX: 1.62 CM/M2
ECHO LV INTERNAL DIMENSION SYSTOLIC: 2.7 CM
ECHO LV IVSD: 0.8 CM (ref 0.6–0.9)
ECHO LV MASS 2D: 123 G (ref 67–162)
ECHO LV MASS INDEX 2D: 73.6 G/M2 (ref 43–95)
ECHO LV POSTERIOR WALL DIASTOLIC: 1.1 CM (ref 0.6–0.9)
ECHO LV RELATIVE WALL THICKNESS RATIO: 0.54
ECHO LVOT AREA: 3.1 CM2
ECHO LVOT AV VTI INDEX: 0.65
ECHO LVOT DIAM: 2 CM
ECHO LVOT MEAN GRADIENT: 3 MMHG
ECHO LVOT PEAK GRADIENT: 5 MMHG
ECHO LVOT PEAK VELOCITY: 1.1 M/S
ECHO LVOT STROKE VOLUME INDEX: 49.1 ML/M2
ECHO LVOT SV: 82 ML
ECHO LVOT VTI: 26.1 CM
ECHO MV A VELOCITY: 0.99 M/S
ECHO MV AREA VTI: 2.5 CM2
ECHO MV E VELOCITY: 1.18 M/S
ECHO MV E/A RATIO: 1.19
ECHO MV E/E' LATERAL: 12.33
ECHO MV E/E' RATIO (AVERAGED): 14.14
ECHO MV E/E' SEPTAL: 15.95
ECHO MV LVOT VTI INDEX: 1.28
ECHO MV MAX VELOCITY: 1.1 M/S
ECHO MV MEAN GRADIENT: 2 MMHG
ECHO MV MEAN VELOCITY: 0.6 M/S
ECHO MV PEAK GRADIENT: 5 MMHG
ECHO MV VTI: 33.4 CM
ECHO PULMONARY ARTERY END DIASTOLIC PRESSURE: 4 MMHG
ECHO PV MAX VELOCITY: 1 M/S
ECHO PV PEAK GRADIENT: 4 MMHG
ECHO PV REGURGITANT MAX VELOCITY: 1 M/S
ECHO RA AREA 4C: 10.5 CM2
ECHO RA END SYSTOLIC VOLUME APICAL 4 CHAMBER INDEX BSA: 12 ML/M2
ECHO RA VOLUME: 20 ML
ECHO RV BASAL DIMENSION: 2.8 CM
ECHO RV FREE WALL PEAK S': 14.9 CM/S
ECHO RV LONGITUDINAL DIMENSION: 7.5 CM
ECHO RV MID DIMENSION: 2 CM
ECHO RV TAPSE: 3.5 CM (ref 1.7–?)
EKG ATRIAL RATE: 79 BPM
EKG DIAGNOSIS: NORMAL
EKG P AXIS: 38 DEGREES
EKG P-R INTERVAL: 152 MS
EKG Q-T INTERVAL: 426 MS
EKG QRS DURATION: 78 MS
EKG QTC CALCULATION (BAZETT): 488 MS
EKG R AXIS: -6 DEGREES
EKG T AXIS: 131 DEGREES
EKG VENTRICULAR RATE: 79 BPM
EOSINOPHIL # BLD: 0.1 K/UL (ref 0–0.6)
EOSINOPHIL NFR BLD: 3.4 %
EOSINOPHIL NFR FLD MANUAL: 1 %
GFR SERPLBLD CREATININE-BSD FMLA CKD-EPI: 5 ML/MIN/{1.73_M2}
GLUCOSE SERPL-MCNC: 92 MG/DL (ref 70–99)
HCT VFR BLD AUTO: 32.6 % (ref 36–48)
HGB BLD-MCNC: 10.7 G/DL (ref 12–16)
LDH SERPL L TO P-CCNC: 250 U/L (ref 100–190)
LYMPHOCYTES # BLD: 1.2 K/UL (ref 1–5.1)
LYMPHOCYTES NFR BLD: 29.1 %
LYMPHOCYTES NFR FLD: 44 %
MACROPHAGES # FLD: 48 %
MCH RBC QN AUTO: 27.5 PG (ref 26–34)
MCHC RBC AUTO-ENTMCNC: 32.8 G/DL (ref 31–36)
MCV RBC AUTO: 84 FL (ref 80–100)
MESOTHL CELL NFR FLD: 1 %
MONOCYTES # BLD: 0.5 K/UL (ref 0–1.3)
MONOCYTES NFR BLD: 10.9 %
MONOCYTES NFR FLD: 5 %
NEUTROPHILS # BLD: 2.4 K/UL (ref 1.7–7.7)
NEUTROPHILS NFR BLD: 55.9 %
NUC CELL # FLD: 109 /CUMM
ORGANISM: ABNORMAL
PLATELET # BLD AUTO: 244 K/UL (ref 135–450)
PMV BLD AUTO: 5.9 FL (ref 5–10.5)
POTASSIUM SERPL-SCNC: 4.3 MMOL/L (ref 3.5–5.1)
PROT SERPL-MCNC: 6.8 G/DL (ref 6.4–8.2)
RBC # BLD AUTO: 3.88 M/UL (ref 4–5.2)
RBC FLUID: <1000 /CUMM
REPORT: NORMAL
REPORT: NORMAL
RESP PATH DNA+RNA PNL L RESP NAA+NON-PRB: ABNORMAL
RESP PATH DNA+RNA PNL NPH NAA+NON-PROBE: NORMAL
SODIUM SERPL-SCNC: 144 MMOL/L (ref 136–145)
TOTAL CELLS COUNTED FLD: 100
WBC # BLD AUTO: 4.2 K/UL (ref 4–11)

## 2025-03-06 PROCEDURE — 87015 SPECIMEN INFECT AGNT CONCNTJ: CPT

## 2025-03-06 PROCEDURE — 71045 X-RAY EXAM CHEST 1 VIEW: CPT

## 2025-03-06 PROCEDURE — 89051 BODY FLUID CELL COUNT: CPT

## 2025-03-06 PROCEDURE — 0W993ZZ DRAINAGE OF RIGHT PLEURAL CAVITY, PERCUTANEOUS APPROACH: ICD-10-PCS | Performed by: INTERNAL MEDICINE

## 2025-03-06 PROCEDURE — 85025 COMPLETE CBC W/AUTO DIFF WBC: CPT

## 2025-03-06 PROCEDURE — 82945 GLUCOSE OTHER FLUID: CPT

## 2025-03-06 PROCEDURE — 87633 RESP VIRUS 12-25 TARGETS: CPT

## 2025-03-06 PROCEDURE — 0202U NFCT DS 22 TRGT SARS-COV-2: CPT

## 2025-03-06 PROCEDURE — 6370000000 HC RX 637 (ALT 250 FOR IP): Performed by: PHYSICIAN ASSISTANT

## 2025-03-06 PROCEDURE — 88112 CYTOPATH CELL ENHANCE TECH: CPT

## 2025-03-06 PROCEDURE — 93010 ELECTROCARDIOGRAM REPORT: CPT | Performed by: INTERNAL MEDICINE

## 2025-03-06 PROCEDURE — 84157 ASSAY OF PROTEIN OTHER: CPT

## 2025-03-06 PROCEDURE — 2580000003 HC RX 258: Performed by: PHYSICIAN ASSISTANT

## 2025-03-06 PROCEDURE — 6360000002 HC RX W HCPCS: Performed by: PHYSICIAN ASSISTANT

## 2025-03-06 PROCEDURE — 32555 ASPIRATE PLEURA W/ IMAGING: CPT

## 2025-03-06 PROCEDURE — 6360000002 HC RX W HCPCS: Performed by: INTERNAL MEDICINE

## 2025-03-06 PROCEDURE — 93306 TTE W/DOPPLER COMPLETE: CPT

## 2025-03-06 PROCEDURE — 83986 ASSAY PH BODY FLUID NOS: CPT

## 2025-03-06 PROCEDURE — 83615 LACTATE (LD) (LDH) ENZYME: CPT

## 2025-03-06 PROCEDURE — C1729 CATH, DRAINAGE: HCPCS

## 2025-03-06 PROCEDURE — 80048 BASIC METABOLIC PNL TOTAL CA: CPT

## 2025-03-06 PROCEDURE — 88305 TISSUE EXAM BY PATHOLOGIST: CPT

## 2025-03-06 PROCEDURE — 2060000000 HC ICU INTERMEDIATE R&B

## 2025-03-06 PROCEDURE — 87070 CULTURE OTHR SPECIMN AEROBIC: CPT

## 2025-03-06 PROCEDURE — 93306 TTE W/DOPPLER COMPLETE: CPT | Performed by: INTERNAL MEDICINE

## 2025-03-06 PROCEDURE — 84155 ASSAY OF PROTEIN SERUM: CPT

## 2025-03-06 PROCEDURE — 2500000003 HC RX 250 WO HCPCS: Performed by: PHYSICIAN ASSISTANT

## 2025-03-06 PROCEDURE — 90945 DIALYSIS ONE EVALUATION: CPT

## 2025-03-06 PROCEDURE — 87116 MYCOBACTERIA CULTURE: CPT

## 2025-03-06 PROCEDURE — 87206 SMEAR FLUORESCENT/ACID STAI: CPT

## 2025-03-06 PROCEDURE — 87205 SMEAR GRAM STAIN: CPT

## 2025-03-06 RX ORDER — MORPHINE SULFATE 2 MG/ML
2 INJECTION, SOLUTION INTRAMUSCULAR; INTRAVENOUS EVERY 4 HOURS PRN
Status: DISCONTINUED | OUTPATIENT
Start: 2025-03-06 | End: 2025-03-13 | Stop reason: HOSPADM

## 2025-03-06 RX ORDER — CALCIUM ACETATE 667 MG/1
2 CAPSULE ORAL
COMMUNITY

## 2025-03-06 RX ORDER — LIDOCAINE HYDROCHLORIDE 10 MG/ML
10 INJECTION, SOLUTION EPIDURAL; INFILTRATION; INTRACAUDAL; PERINEURAL ONCE
Status: COMPLETED | OUTPATIENT
Start: 2025-03-06 | End: 2025-03-06

## 2025-03-06 RX ADMIN — AZITHROMYCIN MONOHYDRATE 500 MG: 500 INJECTION, POWDER, LYOPHILIZED, FOR SOLUTION INTRAVENOUS at 20:20

## 2025-03-06 RX ADMIN — Medication 1 CAPSULE: at 20:16

## 2025-03-06 RX ADMIN — Medication 1 CAPSULE: at 08:33

## 2025-03-06 RX ADMIN — WATER 1000 MG: 1 INJECTION INTRAMUSCULAR; INTRAVENOUS; SUBCUTANEOUS at 20:15

## 2025-03-06 RX ADMIN — ONDANSETRON 4 MG: 2 INJECTION, SOLUTION INTRAMUSCULAR; INTRAVENOUS at 20:03

## 2025-03-06 RX ADMIN — AMLODIPINE BESYLATE 10 MG: 5 TABLET ORAL at 08:32

## 2025-03-06 RX ADMIN — LIDOCAINE HYDROCHLORIDE 10 ML: 10 INJECTION, SOLUTION EPIDURAL; INFILTRATION; INTRACAUDAL; PERINEURAL at 15:57

## 2025-03-06 RX ADMIN — MORPHINE SULFATE 2 MG: 2 INJECTION, SOLUTION INTRAMUSCULAR; INTRAVENOUS at 16:25

## 2025-03-06 ASSESSMENT — PAIN SCALES - GENERAL
PAINLEVEL_OUTOF10: 8
PAINLEVEL_OUTOF10: 0
PAINLEVEL_OUTOF10: 0

## 2025-03-06 NOTE — PROGRESS NOTES
Pt A&Ox 4 on RA. AM assessment and vitals completed and put into flowsheets. AM medications given with no s/s of aspiration. Patient takes pills whole with water. Pt with no questions or concerns voiced to RN at this time. Fall precautions in place and call light within reach.   Vitals:    03/06/25 0820   BP: 134/77   Pulse: 80   Resp: 13   Temp: 98 °F (36.7 °C)   SpO2: 100%

## 2025-03-06 NOTE — PROGRESS NOTES
Patient admitted to room 3369 from ED.  Patient oriented to room, call light, bed rails, phone, lights and bathroom. Patient instructed about prescribed diet, how to use call light, and television.Telemetry box 3369 in place, patient aware of placement and reason.  Bed locked, in lowest position, side rails up 2/4, call light within reach. Patient encouraged to call out with any needs.

## 2025-03-06 NOTE — ED NOTES
Donavon Caceres is a 55 y.o. female admitted for  Principal Problem:    Pleural effusion  Resolved Problems:    * No resolved hospital problems. *  .   Patient Home via self with   Chief Complaint   Patient presents with    Shortness of Breath     SOB, swelling in RLE, a moist cough, peritoneal dialysis daily    .  Patient is alert and Person, Place, Time, and Situation  Patient's baseline mobility: Baseline Mobility: Independent   Code Status: Full Code   Cardiac Rhythm:       Is patient on baseline Oxygen: no how many Liters:   Abnormal Assessment Findings: Pt reports feeling SOB    Isolation: None      NIH Score:    C-SSRS: Risk of Suicide: No Risk  Bedside swallow:        Active LDA's:   Peripheral IV 03/05/25 Right Antecubital (Active)     Patient admitted with a morin: no: Periitoneal dialysis pt   If the morin is chronic was it exchanged:  Reason for morin:   Patient admitted with Central Line:  . PICC line placement confirmed: YES OR NO:[id number]}   Reason for Central line:   Was central line Inserted from an outside facility:        Family/Caregiver Present no Any Concerns: no   Restraints no  Sitter no         Vitals: MEWS Score: 1    Vitals:    03/05/25 2251 03/05/25 2345 03/06/25 0030 03/06/25 0130   BP:  (!) 144/93  138/72   Pulse: 79 77 78 80   Resp: 20 20 18 18   Temp:       TempSrc:       SpO2: 100% 100% 98% 98%   Weight:       Height:           Last documented pain score (0-10 scale) Pain Level: 5  Pain medication administered No.    Pertinent or High Risk Medications/Drips: No.    Pending Blood Product Administration: no    Abnormal labs:   Abnormal Labs Reviewed   CBC WITH AUTO DIFFERENTIAL - Abnormal; Notable for the following components:       Result Value    RBC 3.87 (*)     Hemoglobin 10.9 (*)     Hematocrit 33.0 (*)     RDW 19.2 (*)     All other components within normal limits   COMPREHENSIVE METABOLIC PANEL W/ REFLEX TO MG FOR LOW K - Abnormal; Notable for the following components:    BUN 47

## 2025-03-06 NOTE — PROGRESS NOTES
1000ml of fluid removed  Spoke to patients RN and advised her the amount of fluid removed and that patient was returning to the floor.

## 2025-03-06 NOTE — CONSULTS
MD Dejan Aguilar MD Aldo Estella, DO              Office: [phone]                      Fax: [phone]               [email or web address]                     Nephrology consult received. Full consult report will follow.     Thank you for allowing us to participate in this patient's care. Please do not hesitate to contact us anytime. We will follow along with you.     #ESKD on PD  -resume nightly CCPD.    Rajat Crabtree DO

## 2025-03-06 NOTE — ACP (ADVANCE CARE PLANNING)
Advanced Care Planning Note.    Purpose of Encounter: Advanced care planning in light of ESRD  Parties In Attendance: Patient  Decisional Capacity: Yes  Subjective: Patient has RUBALCAVA  Objective: Cr 9.5  Goals of Care Determination: Patient wants full support (CPR, vent, surgery, HD, trach, PEG)  Plan:  IV Abx, IR thoracentesis, Echo, Pulm and Renal consults, PT/OT/SLP  Code Status: Full code   Time spent on Advanced care Plannin minutes  Advanced Care Planning Documents: Completed advanced directives on chart,  is the POA.    Grzegorz Morrell MD  3/6/2025 1:20 PM

## 2025-03-06 NOTE — PROGRESS NOTES
Pharmacy Home Medication Reconciliation Note    A medication reconciliation has been completed for Donavon Caceres 1970    Pharmacy: [facility] [id number], [facility], [place]  Information provided by: patient    The patient's home medication list is as follows:  No current facility-administered medications on file prior to encounter.     Current Outpatient Medications on File Prior to Encounter   Medication Sig Dispense Refill    calcium carb-cholecalciferol (OYSTER SHELL CALCIUM 250+D) 250-3.125 MG-MCG TABS per tab Take 1 tablet by mouth daily      calcium acetate (PHOSLO) 667 MG CAPS capsule Take 2 capsules by mouth 3 times daily (with meals)      amLODIPine (NORVASC) 10 MG tablet Take 1 tablet by mouth daily      carvedilol (COREG) 12.5 MG tablet Take 1 tablet by mouth 2 times daily (with meals)      acetaminophen (TYLENOL) 500 MG tablet Take 1 tablet by mouth 4 times daily as needed for Pain 28 tablet 0    hydroCHLOROthiazide (HYDRODIURIL) 25 MG tablet Take 25 mg by mouth daily (Patient not taking: Reported on 3/5/2025)      naproxen (NAPROSYN) 500 MG tablet Take 1 tablet by mouth 2 times daily as needed for Pain (swelling) (Patient not taking: Reported on 3/6/2025) 30 tablet 0    lisinopril (PRINIVIL;ZESTRIL) 20 MG tablet Take 10 mg by mouth daily . (Patient not taking: Reported on 3/5/2025)         Patient is no longer taking HCTZ, lisinopril, or naproxen.    Of note, patient received Amlodipine in the ED this morning but all other meds last taken yesterday morning.    Timing of last doses updated.    Thank you,  Shani Rivera, DIPTIhT

## 2025-03-06 NOTE — CONSULTS
PULMONARY AND CRITICAL CARE MEDICINE CONSULTATION NOTE    Pulmonary consult received.  Placed orders for testing the pleural fluid for microbiological, biochemical and cytological testing.  Patient remains on room air.  Full consult note to follow.    Rudi Brand MD  Pulmonary Critical Care and Sleep Medicine  3/5/2025, 8:39 AM    This note was completed using dragon medical speech recognition software. Grammatical errors, random word insertions, pronoun errors and incomplete sentences are occasional consequences of this technology due to software limitations. If there are questions or concerns about the content of this note of information contained within the body of this dictation they should be addressed with the provider for clarification.

## 2025-03-06 NOTE — PROGRESS NOTES
deficits. Cranial nerves: II-XII intact, grossly non-focal.  Psychiatric: Alert and oriented, thought content appropriate, normal insight  Capillary Refill: Brisk, 3 seconds, normal   Peripheral Pulses: +2 palpable, equal bilaterally       Labs:   Recent Labs     03/05/25 1715 03/06/25 0519   WBC 4.0 4.2   HGB 10.9* 10.7*   HCT 33.0* 32.6*    244     Recent Labs     03/05/25 1715 03/06/25 0519    144   K 4.1 4.3    110   CO2 22 19*   BUN 47* 47*   CREATININE 9.1* 9.5*   CALCIUM 8.5 7.6*     Recent Labs     03/05/25 1715   AST 19   ALT 9*   BILITOT 0.3   ALKPHOS 97     Recent Labs     03/05/25 1715   INR 1.05     Recent Labs     03/05/25 1715 03/05/25  1829   TROPHS 35* 34*       Urinalysis:      Lab Results   Component Value Date/Time    NITRU Negative 03/05/2025 05:15 PM    WBCUA 3 03/05/2025 05:15 PM    BACTERIA Rare 03/05/2025 05:15 PM    RBCUA 0 03/05/2025 05:15 PM    BLOODU Negative 03/05/2025 05:15 PM    GLUCOSEU Negative 03/05/2025 05:15 PM    GLUCOSEU NEGATIVE 06/12/2011 07:50 AM       Radiology:  XR CHEST (2 VW)   Final Result   Moderate right pleural effusion with right lower lobe airspace infiltrates   that could represent a combination of atelectasis and/or pneumonia         IR GUIDED THORACENTESIS PLEURAL    (Results Pending)       IP CONSULT TO NEPHROLOGY  IP CONSULT TO INTERVENTIONAL RADIOLOGY  IP CONSULT TO NEPHROLOGY  IP CONSULT TO PULMONOLOGY    Assessment/Plan:    Active Hospital Problems    Diagnosis     Pleural effusion [J90]      RLL PNA with R effusion  Continue IV Rocephin and Zithromax  Check viral panel  IR Thoracentesis requested  Check Echo  Telemetry to r/o arrhythmia  Pulm consult appreciated  SLP eval  ESRD  On PD  Renal consult requested  HTN  Continue Norvasc    DVT Prophylaxis: Hep SQ  Diet: ADULT DIET; Regular  Code Status: Full Code  PT/OT Eval Status: Requested    Dispo - Home    Discussed with patient, nursing and CM.    Will see what work up  shows.    Grzegorz Morrell MD

## 2025-03-06 NOTE — CONSULTS
MD Dejan Aguilar MD Aldjeri ClementeLeyda, DO                 Office: [phone]                      Fax: [phone]             [employer]                   NEPHROLOGY INITIAL CONSULT NOTE        IMPRESSION/RECOMMENDATIONS:      #ESKD on CCPD nightly  -follows with  nephrology, Dr. Alcazar is her nephrologist.  -compliant with nightly treatments.  -EDW 67kg reported.  -per patient Rx 6L total, 1.5% dialysate bags (prior to constipation issues), 9 hours total tx time  -continue home prescription, but alternative 2.5% and 1.5% dialysate bags. 4 exchanges.    #hypervolemia   -slightly volume up.    #pleural effusion  -1L removed per IR thoracentesis 3/6/25.  -pulm following.  '  #HTN  -resume home antihypertensives  -stable    #AGMA  -likely 2/2 ESKD, PNA    #anemia of CKD  -stable, at goal 10.5-11. Monitor.    #PNA - management per primary team      Thank you for the consult.  We will follow this patient along the hospitalization.  Rajat Crabtree DO     High complexity, at risk of impending organ failure needing  higher level of care/monitoring.   Time spent 35 minutes that included face-to-face meeting/discussion with patient, and treatment team (including primary/referring team and other consultants; included coordination of care with the treatment team; and review of patient's electronic medical records and ordering appropriates tests.             Reason for Consult:  ESKD management  Requesting Physician: Grzegorz Espinoza MD     CHIEF COMPLAINT:  sob    History obtained from records and patient.    HISTORY OF PRESENT ILLNESS:                Donavon Caceres  is 55 y.o. y.o. female with significant past medical history of ESKD on CCPD nightly, HTN who presented with sob .  Nephrology consulted for ESKD management.    She reports had  low UF alarm one day and had been having constipation. Notified her PD nurse dialysis center who prescribed her a laxative. Once she had BMs, her UF improved  mentioned in the HPI    Negative fatigue  No chest pain nor palpitations  No hemoptysis  No abdominal pain, nausea, vomiting nor diarrhea  No fever/chills  No change in urine output nor gross hematuria    PHYSICAL EXAM:    Vitals:  BP (!) 140/93   Pulse 86   Temp 98.2 °F (36.8 °C) (Oral)   Resp 22   Ht 1.524 m (5')   Wt 69.4 kg (153 lb)   SpO2 99%   BMI 29.88 kg/m²   WEIGHT: well tolerated  I/O's since admission    CONSTITUTIONAL:  awake, alert, cooperative, no apparent distress, and appears stated age  EYES:  Lids and lashes normal, pupils equal, round   NECK:  Supple, symmetrical, trachea midline  HEMATOLOGIC/LYMPHATICS:  no pallor, no cervical LAD  LUNGS:  No increased work of breathing, diminished bibasilar breaths  CARDIOVASCULAR:  regular rate and rhythm, normal S1 and S2  ABDOMEN:  No scars, normal bowel sounds, soft, non-distended, non-tender  EXTREMITIES:  Warm, dry, edema trace  NEUROLOGIC:  Awake, alert, oriented to name, place and time.      DATA:    CBC:   Lab Results   Component Value Date/Time    WBC 4.2 03/06/2025 05:19 AM    RBC 3.88 03/06/2025 05:19 AM    HGB 10.7 03/06/2025 05:19 AM    HCT 32.6 03/06/2025 05:19 AM    MCV 84.0 03/06/2025 05:19 AM    MCH 27.5 03/06/2025 05:19 AM    MCHC 32.8 03/06/2025 05:19 AM    RDW 19.0 03/06/2025 05:19 AM     03/06/2025 05:19 AM    MPV 5.9 03/06/2025 05:19 AM     BMP:   Lab Results   Component Value Date/Time     03/06/2025 05:19 AM    K 4.3 03/06/2025 05:19 AM     03/06/2025 05:19 AM    CO2 19 03/06/2025 05:19 AM    BUN 47 03/06/2025 05:19 AM    CREATININE 9.5 03/06/2025 05:19 AM    CALCIUM 7.6 03/06/2025 05:19 AM    GFRAA 37 08/20/2014 04:50 PM    LABGLOM 5 03/06/2025 05:19 AM    GLUCOSE 92 03/06/2025 05:19 AM   Magnesium:  No results found for: \"MG\"Phosphorus:  No results found for: \"PHOS\"

## 2025-03-06 NOTE — H&P
[facility] Medicine History & Physical        Name:  Donavon Caceres /Age/Sex: 1970  (55 y.o. female)   MRN & CSN:  [id number] & [id number] Encounter Date/Time: 3/5/2025 9:10 PM EST   Location:  ED- PCP: No primary care provider on file.         CHIEF COMPLAINT:   Chief Complaint   Patient presents with    Shortness of Breath     SOB, swelling in RLE, a moist cough, peritoneal dialysis daily          HISTORY OF PRESENT ILLNESS:      History from: patient  Limitations to history : None     Donavon Caceres is a 55 y.o. female who presented to ED for evaluation of shortness of breath that has been ongoing for 1 day.  She reports she has had a cough for the past few weeks.  She reports shortness of breath is worse when lying flat and with exertion.  She has ESRD and does PD daily.  She denies fever, chest pain, GI symptoms. She denies any other complaints or concerns at this time.      REVIEW OF SYSTEMS:   Pertinent positives as noted in the HPI. All other systems reviewed and negative.      PHYSICAL EXAM PERFORMED:  BP (!) 154/92   Pulse 80   Temp 97.8 °F (36.6 °C) (Oral)   Resp 24   Ht 1.54 m (5' 0.63\")   Wt 69.4 kg (153 lb)   SpO2 97%   BMI 29.26 kg/m²     General appearance:  Awake, alert, no apparent distress  HEENT:  Normocephalic, atraumatic without obvious deformity. PERRL. EOM intact. Conjunctivae/corneas clear.  Neck:  Supple, with full range of motion. No JVD. Trachea midline.  Respiratory:  Clear to auscultation bilaterally without rales, wheezes, or rhonchi. Normal respiratory effort.   Cardiovascular:  Regular rate and rhythm without murmurs, rubs or gallops.   Abdomen:  Soft, NT, ND, without rebound or guarding. Normal bowel sounds.  Extremities:  No clubbing, cyanosis, or edema bilaterally.  Full range of motion without deformity. +2 palpable pulses, equal bilaterally. Capillary refill brisk,< 3 seconds   Skin:  No rashes or lesions. Warm/dry.  Neurologic:   Transportation Needs (9/6/2024)    [facility]    Yearly Questionnaire     Do you need any assistance with obtaining housing, meals, medication, transportation or medical equipment?: No       LABS  CBC:   Recent Labs     03/05/25 1715   WBC 4.0   HGB 10.9*        BMP:    Recent Labs     03/05/25 1715      K 4.1      CO2 22   BUN 47*   CREATININE 9.1*   GLUCOSE 88     Hepatic:   Recent Labs     03/05/25 1715   AST 19   ALT 9*   BILITOT 0.3   ALKPHOS 97     Lipids: No results found for: \"CHOL\", \"HDL\", \"TRIG\"  Hemoglobin A1C: No results found for: \"LABA1C\"  TSH: No results found for: \"TSH\"  Troponin: No results found for: \"TROPONINT\"  Lactic Acid: No results for input(s): \"LACTA\" in the last 72 hours.  BNP:   Recent Labs     03/05/25 1715   PROBNP 1,021*     UA:  Lab Results   Component Value Date/Time    NITRU Negative 03/05/2025 05:15 PM    COLORU Yellow 03/05/2025 05:15 PM    PHUR 8.0 03/05/2025 05:15 PM    PHUR 6.0 06/12/2011 07:50 AM    WBCUA 3 03/05/2025 05:15 PM    RBCUA 0 03/05/2025 05:15 PM    BACTERIA Rare 03/05/2025 05:15 PM    CLARITYU Clear 03/05/2025 05:15 PM    LEUKOCYTESUR Negative 03/05/2025 05:15 PM    UROBILINOGEN 0.2 03/05/2025 05:15 PM    BILIRUBINUR Negative 03/05/2025 05:15 PM    BLOODU Negative 03/05/2025 05:15 PM    GLUCOSEU Negative 03/05/2025 05:15 PM    GLUCOSEU NEGATIVE 06/12/2011 07:50 AM    KETUA Negative 03/05/2025 05:15 PM     Urine Cultures: No results found for: \"LABURIN\"  Blood Cultures: No results found for: \"BC\"  No results found for: \"BLOODCULT2\"  Organism: No results found for: \"ORG\"    IMAGING/DIAGNOSTICS LAST 24 HOURS    XR CHEST (2 VW)    Result Date: 3/5/2025  EXAMINATION: TWO XRAY VIEWS OF THE CHEST 3/5/2025 5:30 pm COMPARISON: None. HISTORY: ORDERING SYSTEM PROVIDED HISTORY: Shortness of Breath TECHNOLOGIST PROVIDED HISTORY: Reason for exam:->Shortness of Breath Reason for Exam: Shortness of Breath FINDINGS: Moderate

## 2025-03-07 ENCOUNTER — APPOINTMENT (OUTPATIENT)
Dept: GENERAL RADIOLOGY | Age: 55
DRG: 177 | End: 2025-03-07
Payer: MEDICARE

## 2025-03-07 LAB
ACID FAST STN SPEC QL: NORMAL
ANION GAP SERPL CALCULATED.3IONS-SCNC: 14 MMOL/L (ref 3–16)
BASOPHILS # BLD: 0 K/UL (ref 0–0.2)
BASOPHILS NFR BLD: 0.7 %
BUN SERPL-MCNC: 42 MG/DL (ref 7–20)
CALCIUM SERPL-MCNC: 8.4 MG/DL (ref 8.3–10.6)
CHLORIDE SERPL-SCNC: 106 MMOL/L (ref 99–110)
CO2 SERPL-SCNC: 21 MMOL/L (ref 21–32)
CREAT SERPL-MCNC: 9.3 MG/DL (ref 0.6–1.1)
DEPRECATED RDW RBC AUTO: 19.3 % (ref 12.4–15.4)
EOSINOPHIL # BLD: 0.2 K/UL (ref 0–0.6)
EOSINOPHIL NFR BLD: 2.8 %
GFR SERPLBLD CREATININE-BSD FMLA CKD-EPI: 5 ML/MIN/{1.73_M2}
GLUCOSE FLD-MCNC: 114 MG/DL
GLUCOSE SERPL-MCNC: 128 MG/DL (ref 70–99)
HCT VFR BLD AUTO: 34.5 % (ref 36–48)
HGB BLD-MCNC: 11.3 G/DL (ref 12–16)
LDH FLD L TO P-CCNC: 55 U/L
LYMPHOCYTES # BLD: 1.1 K/UL (ref 1–5.1)
LYMPHOCYTES NFR BLD: 21.1 %
MCH RBC QN AUTO: 27.7 PG (ref 26–34)
MCHC RBC AUTO-ENTMCNC: 32.8 G/DL (ref 31–36)
MCV RBC AUTO: 84.6 FL (ref 80–100)
MONOCYTES # BLD: 0.4 K/UL (ref 0–1.3)
MONOCYTES NFR BLD: 8 %
NEUTROPHILS # BLD: 3.6 K/UL (ref 1.7–7.7)
NEUTROPHILS NFR BLD: 67.4 %
PH FLD STRIP: 8 [PH]
PLATELET # BLD AUTO: 260 K/UL (ref 135–450)
PMV BLD AUTO: 5.7 FL (ref 5–10.5)
POTASSIUM SERPL-SCNC: 3.9 MMOL/L (ref 3.5–5.1)
PROT FLD-MCNC: 0.9 G/DL
RBC # BLD AUTO: 4.07 M/UL (ref 4–5.2)
SODIUM SERPL-SCNC: 141 MMOL/L (ref 136–145)
SPECIMEN SOURCE FLD: NORMAL
WBC # BLD AUTO: 5.3 K/UL (ref 4–11)

## 2025-03-07 PROCEDURE — 6370000000 HC RX 637 (ALT 250 FOR IP): Performed by: INTERNAL MEDICINE

## 2025-03-07 PROCEDURE — 2580000003 HC RX 258: Performed by: PHYSICIAN ASSISTANT

## 2025-03-07 PROCEDURE — 2500000003 HC RX 250 WO HCPCS: Performed by: INTERNAL MEDICINE

## 2025-03-07 PROCEDURE — 99223 1ST HOSP IP/OBS HIGH 75: CPT | Performed by: INTERNAL MEDICINE

## 2025-03-07 PROCEDURE — 2060000000 HC ICU INTERMEDIATE R&B

## 2025-03-07 PROCEDURE — 71045 X-RAY EXAM CHEST 1 VIEW: CPT

## 2025-03-07 PROCEDURE — 6360000002 HC RX W HCPCS: Performed by: INTERNAL MEDICINE

## 2025-03-07 PROCEDURE — 87081 CULTURE SCREEN ONLY: CPT

## 2025-03-07 PROCEDURE — 36415 COLL VENOUS BLD VENIPUNCTURE: CPT

## 2025-03-07 PROCEDURE — 6360000002 HC RX W HCPCS: Performed by: PHYSICIAN ASSISTANT

## 2025-03-07 PROCEDURE — 90945 DIALYSIS ONE EVALUATION: CPT

## 2025-03-07 PROCEDURE — 2580000003 HC RX 258: Performed by: INTERNAL MEDICINE

## 2025-03-07 PROCEDURE — 85025 COMPLETE CBC W/AUTO DIFF WBC: CPT

## 2025-03-07 PROCEDURE — 6370000000 HC RX 637 (ALT 250 FOR IP): Performed by: PHYSICIAN ASSISTANT

## 2025-03-07 PROCEDURE — 2500000003 HC RX 250 WO HCPCS: Performed by: PHYSICIAN ASSISTANT

## 2025-03-07 PROCEDURE — 80048 BASIC METABOLIC PNL TOTAL CA: CPT

## 2025-03-07 RX ORDER — CALCIUM ACETATE 667 MG/1
2 CAPSULE ORAL
Status: DISCONTINUED | OUTPATIENT
Start: 2025-03-07 | End: 2025-03-13 | Stop reason: HOSPADM

## 2025-03-07 RX ORDER — CARVEDILOL 6.25 MG/1
12.5 TABLET ORAL 2 TIMES DAILY WITH MEALS
Status: DISCONTINUED | OUTPATIENT
Start: 2025-03-07 | End: 2025-03-13 | Stop reason: HOSPADM

## 2025-03-07 RX ADMIN — Medication 1 CAPSULE: at 18:52

## 2025-03-07 RX ADMIN — CALCIUM ACETATE 1334 MG: 667 CAPSULE ORAL at 13:20

## 2025-03-07 RX ADMIN — AMLODIPINE BESYLATE 10 MG: 5 TABLET ORAL at 09:44

## 2025-03-07 RX ADMIN — WATER 1000 MG: 1 INJECTION INTRAMUSCULAR; INTRAVENOUS; SUBCUTANEOUS at 20:48

## 2025-03-07 RX ADMIN — CALCIUM ACETATE 1334 MG: 667 CAPSULE ORAL at 09:45

## 2025-03-07 RX ADMIN — AZITHROMYCIN MONOHYDRATE 500 MG: 500 INJECTION, POWDER, LYOPHILIZED, FOR SOLUTION INTRAVENOUS at 20:51

## 2025-03-07 RX ADMIN — Medication 1 CAPSULE: at 09:44

## 2025-03-07 RX ADMIN — WATER 40 MG: 1 INJECTION INTRAMUSCULAR; INTRAVENOUS; SUBCUTANEOUS at 14:47

## 2025-03-07 RX ADMIN — SODIUM CHLORIDE 1500 MG: 0.9 INJECTION, SOLUTION INTRAVENOUS at 11:36

## 2025-03-07 RX ADMIN — SODIUM CHLORIDE, PRESERVATIVE FREE 10 ML: 5 INJECTION INTRAVENOUS at 11:11

## 2025-03-07 RX ADMIN — CALCIUM ACETATE 1334 MG: 667 CAPSULE ORAL at 18:52

## 2025-03-07 RX ADMIN — SODIUM CHLORIDE, PRESERVATIVE FREE 10 ML: 5 INJECTION INTRAVENOUS at 20:48

## 2025-03-07 RX ADMIN — CARVEDILOL 12.5 MG: 6.25 TABLET, FILM COATED ORAL at 09:44

## 2025-03-07 RX ADMIN — CARVEDILOL 12.5 MG: 6.25 TABLET, FILM COATED ORAL at 18:52

## 2025-03-07 ASSESSMENT — PAIN SCALES - GENERAL
PAINLEVEL_OUTOF10: 0

## 2025-03-07 NOTE — PROGRESS NOTES
Clinical Pharmacy Note: Pharmacy to Dose Vancomycin    Donavon Caceres is a 55 y.o. female started on Vancomycin for PNA; consult received from Dr. Morrell to manage therapy. Also receiving the following antibiotics: Azithromycin and Ceftriaxone.    Goal AUC: 400-600 mg/L*hr  Goal Trough Level: 15-20 mcg/mL    Assessment/Plan:  A 1500 mg loading dose was ordered to given on 3/7 at 1045  Pt is on CCPD nightly. Will be pulse dosing.  A vancomycin random level has been ordered for 3/9 at 0600  Changes in regimen will be determined based on culture results, renal function, and clinical response.  Pharmacy will continue to monitor and adjust regimen as necessary.    Allergies:  Patient has no known allergies.     Recent Labs     03/05/25  1715 03/06/25  0519   CREATININE 9.1* 9.5*       Recent Labs     03/05/25  1715 03/06/25  0519   WBC 4.0 4.2       Ht Readings from Last 1 Encounters:   03/06/25 1.524 m (5')        Wt Readings from Last 1 Encounters:   03/07/25 68.9 kg (152 lb)         Estimated Creatinine Clearance: 6 mL/min (A) (based on SCr of 9.5 mg/dL (HH)).      Thank you for the consult,    Sarthak Dickey [facility]  [id number]

## 2025-03-07 NOTE — PROGRESS NOTES
CCPD Order   Exchanges: 4   Exchange Volume: 2000 ml   Total Time: 10 hrs   Dextrose: 2.5 and 1.5%   Last Fill: 0 ml   Total Volume: 8000 ml     Orders verified. Supplies taken to pt's room. Report received. Cycler set up, primed and pre tested. [facility] Pt connected to cycler. CCPD initiated without problem. Initial effluent clear.       If problems should arise please call the [phone] on top of PD cycler machine.

## 2025-03-07 NOTE — PROGRESS NOTES
Hospitalist Progress Note      PCP: No primary care provider on file.    Date of Admission: 3/5/2025    Chief Complaint: SOB    Hospital Course: 54 yo F with ESRD on PD, HTN came to ER with SOB.  Admitted as inpatient for PNA with R effusion.  Started on IV Abx.  Follow by Pulm and Renal. S/P IR R thoracentesis of 1 L on 3/6, felt to be transudative.      Echo   Left Ventricle: Normal left ventricular systolic function with a visually estimated EF of 55 - 60%. Left ventricle size is normal. Mildly increased wall thickness. Findings consistent with mild concentric hypertrophy. Normal wall motion. Diastolic dysfunction present with normal LV EF.    Right Ventricle: Right ventricle size is normal. Normal systolic function.    Aortic Valve: Not well visualized. Mildly thickened cusps. Mild regurgitation.    Tricuspid Valve: Unable to assess RVSP due to inadequate or insignificant tricuspid regurgitation.    Pericardium: Trivial pericardial effusion present. No indication of cardiac tamponade.    Extracardiac: Pleural effusion.    Image quality is technically difficult. Procedure performed with the patient in a supine position..      Viral panel negative.    PNA panel with Staph aureus, Strep pneumoniae, Mycoplasma pneumoniae and rhinovirus.  Vanco IV added given staph.  Respiratory cx has been requested.    Subjective:  Patient remains SOB and coughing.  Now on 6 L NC.  No CP, HA or abdominal pain.         Medications:  Reviewed    Infusion Medications    sodium chloride       Scheduled Medications    carvedilol  12.5 mg Oral BID     calcium acetate  2 capsule Oral TID     methylPREDNISolone  40 mg IntraVENous Daily    amLODIPine  10 mg Oral Daily    sodium chloride flush  5-40 mL IntraVENous 2 times per day    heparin (porcine)  5,000 Units SubCUTAneous 3 times per day    cefTRIAXone (ROCEPHIN) IV  1,000 mg IntraVENous Q24H    azithromycin  500 mg IntraVENous Q24H    lactobacillus  1 capsule Oral BID       PRN Meds: sulfur hexafluoride microspheres, morphine, sodium chloride flush, sodium chloride, ondansetron, senna, acetaminophen **OR** acetaminophen    No intake or output data in the 24 hours ending 03/07/25 1356    Physical Exam Performed:    BP (!) 146/88   Pulse 82   Temp 98.2 °F (36.8 °C) (Oral)   Resp 24   Ht 1.524 m (5')   Wt 68.9 kg (152 lb) Comment: Empty and drained  SpO2 90%   BMI 29.69 kg/m²     General appearance: No apparent distress, appears stated age and cooperative.  HEENT: Pupils equal, round, and reactive to light. Conjunctivae/corneas clear.  Neck: Supple, with full range of motion. No jugular venous distention. Trachea midline.  Respiratory:  BL rales and rhonchi.  No wheezing  Cardiovascular: Regular rate and rhythm with normal S1/S2 without murmurs, rubs or gallops.  Abdomen: Soft, PD catheter, non-tender, non-distended with normal bowel sounds.  Musculoskeletal: 1+ BL LE edema.  Full range of motion without deformity.  Skin: Skin color, texture, turgor normal.  No rashes or lesions.  Neurologic:  Neurovascularly intact without any focal sensory/motor deficits. Cranial nerves: II-XII intact, grossly non-focal.  Psychiatric: Alert and oriented, thought content appropriate, normal insight  Capillary Refill: Brisk, 3 seconds, normal   Peripheral Pulses: +2 palpable, equal bilaterally       Labs:   Recent Labs     03/05/25 1715 03/06/25  0519 03/07/25  1102   WBC 4.0 4.2 5.3   HGB 10.9* 10.7* 11.3*   HCT 33.0* 32.6* 34.5*    244 260     Recent Labs     03/05/25  1715 03/06/25  0519 03/07/25  1102    144 141   K 4.1 4.3 3.9    110 106   CO2 22 19* 21   BUN 47* 47* 42*   CREATININE 9.1* 9.5* 9.3*   CALCIUM 8.5 7.6* 8.4     Recent Labs     03/05/25 1715   AST 19   ALT 9*   BILITOT 0.3   ALKPHOS 97     Recent Labs     03/05/25 1715   INR 1.05     Recent Labs     03/05/25  1715 03/05/25  1829   TROPHS 35* 34*       Urinalysis:      Lab Results   Component Value

## 2025-03-07 NOTE — CONSULTS
VIEW    Result Date: 3/6/2025  Status post right thoracentesis with elimination of right pleural effusion. No evidence of pneumothorax.     IR GUIDED THORACENTESIS PLEURAL    Result Date: 3/6/2025  Successful ultrasound guided right thoracentesis.     XR CHEST (2 VW)    Result Date: 3/5/2025  Moderate right pleural effusion with right lower lobe airspace infiltrates that could represent a combination of atelectasis and/or pneumonia        CULTURES:      Results       Procedure Component Value Units Date/Time    Culture, MRSA, Screening [[id number]]     Order Status: Sent Specimen: Nasal from Nares     Culture, Body Fluid (with Gram Stain) [[id number]] Collected: 03/06/25 1600    Order Status: Sent Specimen: Body Fluid from Pleural Updated: 03/07/25 0229     Gram Stain Result No Epithelial Cells seen  1+ WBC's (Mononuclear) present  No organisms seen      Narrative:      ORDER#: [id number]                          ORDERED BY: MANUEL KEVIN  SOURCE: Pleural                            COLLECTED:  03/06/25 16:00  ANTIBIOTICS AT [place].:                      RECEIVED :  03/06/25 20:29    Culture with Smear, Acid Fast Bacillius [[id number]] Collected: 03/06/25 1600    Order Status: Sent Specimen: Pleural Updated: 03/07/25 1120     AFB Smear No AFB observed by Fluorescent stain    Narrative:      ORDER#: [id number]                          ORDERED BY: MANUEL KEVIN  SOURCE: Pleural                            COLLECTED:  03/06/25 16:00  ANTIBIOTICS AT [place].:                      RECEIVED :  03/07/25 02:50    Respiratory Panel, Molecular, with COVID-19 (Restricted: peds pts or suitable admitted adults) [[id number]] Collected: 03/06/25 0818    Order Status: Completed Specimen: Nasopharyngeal Updated: 03/06/25 1552     Respiratory Panel PCR --     Respiratory Pathogens Panel PCR Result: Not Detected  See additional report for complete Respiratory Pathogens Panel      Narrative:      ORDER#: [id number]                           ORDERED BY: ADE LARSON  SOURCE: Nasopharyngeal nares               COLLECTED:  03/06/25 08:18  ANTIBIOTICS AT [place].:                      RECEIVED :  03/06/25 14:16    Pneumonia Panel, Molecular [[id number]]  (Abnormal) Collected: 03/06/25 0818    Order Status: Completed Specimen: Sputum, Suctioned Updated: 03/06/25 1645     Organism Staph aureus DNA Detected     Pneumonia Panel Molecular --     1,000,000 copies/mL  mecA gene not detected  See additional report for complete Pneumonia panel.       Organism Streptococcus pneumoniae DNA Detected     Pneumonia Panel Molecular --     1,000,000 copies/mL  See additional report for complete Pneumonia panel.       Organism Mycoplasma pneumoniae by PCR     Pneumonia Panel Molecular --     POSITIVE FOR  See additional report for complete Pneumonia panel.       Organism Human Rhinovirus/Enterovirus by PCR     Pneumonia Panel Molecular --     POSITIVE FOR  See additional report for complete Pneumonia panel.      Narrative:      ORDER#: [id number]                          ORDERED BY: ADE LARSON  SOURCE: Sputum Suctioned mucous            COLLECTED:  03/06/25 08:18  ANTIBIOTICS AT [place].:                      RECEIVED :  03/06/25 14:16    Respiratory Panel Film Array Report [[id number]] Collected: 03/06/25 0818    Order Status: Completed Updated: 03/06/25 1553     Report SEE IMAGE    PNEUMONIA PANEL FILM ARRAY REPORT [[id number]] Collected: 03/06/25 0818    Order Status: Completed Updated: 03/06/25 1647     Report SEE IMAGE    COVID-19 & Influenza Combo [[id number]] Collected: 03/05/25 1715    Order Status: Completed Specimen: Nasopharyngeal Swab Updated: 03/05/25 1754     SARS-CoV-2 RNA, RT PCR NOT DETECTED     Comment: Not Detected results do not preclude SARS-CoV-2 infection and  should not be used as the sole basis for patient management  decisions.  Results must be combined with clinical observations,  patient history, and epidemiological information.  Testing was

## 2025-03-07 NOTE — PROGRESS NOTES
MD Communication    With Grzegorz Morrell MD at 0812     Situation  Increased shortness of breath, oxygen requirements increased to 6L from 2L  Requesting home medications be resumed      Action

## 2025-03-07 NOTE — PLAN OF CARE
Problem: Respiratory - Adult  Goal: Achieves optimal ventilation and oxygenation  Outcome: Progressing      03/07/25 0045 03/07/25 0047   Oxygen Therapy   SpO2 (!) 89 % 91 %   Pulse Oximetry Type Intermittent Intermittent   Pulse Oximeter Device Location Finger Finger   O2 Device Nasal cannula Nasal cannula   O2 Flow Rate (L/min) 2 L/min  (increased to 3L n/c now) 3 L/min  (increased to 4L n/c now)        03/07/25 0055 03/07/25 0058   Oxygen Therapy   SpO2 90 % 97 %  (93-97%)   Pulse Oximetry Type Intermittent Intermittent   Pulse Oximeter Device Location Finger Finger   O2 Device Nasal cannula Nasal cannula   O2 Flow Rate (L/min) 4 L/min  (increased to 5L n/c now) 5 L/min

## 2025-03-07 NOTE — PROGRESS NOTES
Disconnected from CCPD per protocol.  Effluent: clear  Total time: 10 hr 6 min   Total UF:  -898 ml.  Total Volume:  8007 ml.  Dwell time gained:  0 hr 40 min.  Pt Tolerated procedure: complained of not feeling well after PD, informed doctor Leyda  Report given to: Janelle Tomlinson, RN  Last BM: 147/90

## 2025-03-07 NOTE — PROGRESS NOTES
MD Dejan Aguilar MD  Rajat CrabtreeDO                 Office: [phone]                      Fax: [phone]             [employer]                   NEPHROLOGY CONSULT PROGRESS NOTE    Subjective / interval history / medical decision making.  -tolerated PD well overnight. No new complaints.    IMPRESSION/RECOMMENDATIONS:      #ESKD on CCPD nightly  -follows with  nephrology, Dr. Alcazar is her nephrologist.  -compliant with nightly treatments.  -EDW 67kg reported.  -per patient Rx 6L total, 1.5% dialysate bags (prior to constipation issues), 9 hours total tx time  -home prescription, but alternative 2.5% and 1.5% dialysate bags. 4 exchanges.  -adjust for increased UF: all bags 2.5% (green) for ongoing CCPD while inpatient.    #hypervolemia   -slightly volume up. Improving on CCPD    #pleural effusion  -1L removed per IR thoracentesis 3/6/25.  -pulm following.  '  #HTN  -resume home antihypertensives  -stable    #AGMA  -likely 2/2 ESKD, PNA    #anemia of CKD  -stable, at goal 10.5-11. Monitor.    #PNA - management per primary team      Thank you for the consult.  We will follow this patient along the hospitalization.  Rajat Crabtree DO     High complexity, at risk of impending organ failure needing  higher level of care/monitoring.   Time spent 35 minutes that included face-to-face meeting/discussion with patient, and treatment team (including primary/referring team and other consultants; included coordination of care with the treatment team; and review of patient's electronic medical records and ordering appropriates tests.             Reason for Consult:  ESKD management  Requesting Physician: Grzegorz Espinoza MD     CHIEF COMPLAINT:  sob    History obtained from records and patient.    HISTORY OF PRESENT ILLNESS:                Donavon Caceres  is 55 y.o. y.o. female with significant past medical history of ESKD on CCPD nightly, HTN who presented with sob .  Nephrology consulted  mg, IntraVENous, Q24H  azithromycin (ZITHROMAX) 500 mg in sodium chloride 0.9 % 250 mL IVPB ([id number]), 500 mg, IntraVENous, Q24H  lactobacillus (CULTURELLE) capsule 1 capsule, 1 capsule, Oral, BID WC  Allergies:    Patient has no known allergies.   Social History:     reports that she has never smoked. She has never used smokeless tobacco. She reports that she does not drink alcohol and does not use drugs.   Family History:   family history includes High Blood Pressure in her mother; No Known Problems in her father.     REVIEW OF SYSTEMS:    System review was done , pertinent positives are mentioned in the HPI    Negative fatigue  No chest pain nor palpitations  No hemoptysis  No abdominal pain, nausea, vomiting nor diarrhea  No fever/chills  No change in urine output nor gross hematuria    PHYSICAL EXAM:    Vitals:  BP (!) 146/88   Pulse 82   Temp 98.2 °F (36.8 °C) (Oral)   Resp 24   Ht 1.524 m (5')   Wt 68.9 kg (152 lb) Comment: Empty and drained  SpO2 90%   BMI 29.69 kg/m²   WEIGHT: well tolerated  I/O's since admission    CONSTITUTIONAL:  awake, alert, cooperative, no apparent distress, and appears stated age  EYES:  Lids and lashes normal, pupils equal, round   NECK:  Supple, symmetrical, trachea midline  HEMATOLOGIC/LYMPHATICS:  no pallor, no cervical LAD  LUNGS:  No increased work of breathing, diminished bibasilar breaths  CARDIOVASCULAR:  regular rate and rhythm, normal S1 and S2  ABDOMEN:  No scars, normal bowel sounds, soft, non-distended, non-tender  EXTREMITIES:  Warm, dry, edema trace  NEUROLOGIC:  Awake, alert, oriented to name, place and time.      DATA:    CBC:   Lab Results   Component Value Date/Time    WBC 5.3 03/07/2025 11:02 AM    RBC 4.07 03/07/2025 11:02 AM    HGB 11.3 03/07/2025 11:02 AM    HCT 34.5 03/07/2025 11:02 AM    MCV 84.6 03/07/2025 11:02 AM    MCH 27.7 03/07/2025 11:02 AM    MCHC 32.8 03/07/2025 11:02 AM    RDW 19.3 03/07/2025 11:02 AM     03/07/2025 11:02 AM    MPV

## 2025-03-07 NOTE — DIALYSIS
CCPD Order   Exchanges: 4   Exchange Volume: 1500 ml   Total Time: 9 hrs   Dextrose: 2.5% AND 1.5   Total Volume: 6000 ml     Orders verified. Supplies taken to pt's room. Report received. Cycler set up, primed and pre tested. [facility]. Pt connected to cycler. CCPD initiated without problem. Initial effluent clear.       If problems should arise please call the [phone] number on top of PD cycler machine.

## 2025-03-07 NOTE — PLAN OF CARE
Problem: Chronic Conditions and Co-morbidities  Goal: Patient's chronic conditions and co-morbidity symptoms are monitored and maintained or improved  Outcome: Progressing  Flowsheets (Taken 3/7/2025 0800)  Care Plan - Patient's Chronic Conditions and Co-Morbidity Symptoms are Monitored and Maintained or Improved:   Monitor and assess patient's chronic conditions and comorbid symptoms for stability, deterioration, or improvement   Update acute care plan with appropriate goals if chronic or comorbid symptoms are exacerbated and prevent overall improvement and discharge   Collaborate with multidisciplinary team to address chronic and comorbid conditions and prevent exacerbation or deterioration     Problem: Pain  Goal: Verbalizes/displays adequate comfort level or baseline comfort level  3/7/2025 0220 by Ida Crump RN  Outcome: Progressing     Problem: Hematologic - Adult  Goal: Maintains hematologic stability  3/7/2025 1556 by Janelle Tomlinson RN  Outcome: Progressing  3/7/2025 0220 by Ida Crump RN  Outcome: Progressing     Problem: Respiratory - Adult  Goal: Achieves optimal ventilation and oxygenation  3/7/2025 1556 by Janelle Tomlinson RN  Outcome: Not Progressing  Flowsheets (Taken 3/7/2025 0800)  Achieves optimal ventilation and oxygenation:   Assess for changes in respiratory status   Assess for changes in mentation and behavior   Position to facilitate oxygenation and minimize respiratory effort   Oxygen supplementation based on oxygen saturation or arterial blood gases   Encourage broncho-pulmonary hygiene including cough, deep breathe, incentive spirometry   Respiratory therapy support as indicated   Assess and instruct to report shortness of breath or any respiratory difficulty  Note:   Cough, deep breathe, incentive spirometry encouraged  Unable to wean oxygen.  Remains at 6L NC  Encouraged activity, OOB to chair  3/7/2025 0226 by Ida Crump RN  Outcome: Progressing

## 2025-03-07 NOTE — PLAN OF CARE
Problem: Pain  Goal: Verbalizes/displays adequate comfort level or baseline comfort level  Outcome: Progressing     Problem: Hematologic - Adult  Goal: Maintains hematologic stability  Outcome: Progressing     Problem: Respiratory - Adult  Goal: Achieves optimal ventilation and oxygenation  Outcome: Progressing      03/07/25 0429 03/07/25 0431   Vitals   Temp 98.1 °F (36.7 °C)  --    Temp Source Oral  --    Pulse 88  --    Heart Rate Source Brachial;Radial  --    Respirations 20  --    BP (!) 162/99  --    MAP (Calculated) 120  --    BP Location Left upper arm  --    BP Upper/Lower Upper  --    BP Method Automatic  --    Patient Position Sitting  --    Cardiac Rhythm Sinus rhythm  --    Pain Assessment   Pain Assessment 0-10  --    Pain Level 0  --    Oxygen Therapy   SpO2 92 %  (90-92% on 5L n/c) 93 %   Pulse Oximetry Type Intermittent Intermittent   Pulse Oximeter Device Location Finger Finger   O2 Device Nasal cannula Nasal cannula   O2 Flow Rate (L/min) 5 L/min  (increased to 6L n/c now for pt comfort) 6 L/min

## 2025-03-07 NOTE — PROGRESS NOTES
Clinical Pharmacy Note: Pharmacy to Dose Vancomycin    Donavon Caceres is a 55 y.o. female started on Vancomycin for PNA; consult received from Dr. Morrell to manage therapy. Also receiving the following antibiotics: Azithromycin and Ceftriaxone.    Goal Trough Level: 15-20 mcg/mL    Assessment/Plan:  A 1500 mg loading dose was ordered to given on 3/7 at 1045  Pt is on CCPD nightly. Will be pulse dosing.  A vancomycin random level has been ordered for 3/9 at 0600  Changes in regimen will be determined based on culture results, renal function, and clinical response.  Pharmacy will continue to monitor and adjust regimen as necessary.    Allergies:  Patient has no known allergies.     Recent Labs     03/05/25  1715 03/06/25  0519   CREATININE 9.1* 9.5*       Recent Labs     03/05/25  1715 03/06/25  0519   WBC 4.0 4.2       Ht Readings from Last 1 Encounters:   03/06/25 1.524 m (5')        Wt Readings from Last 1 Encounters:   03/07/25 68.9 kg (152 lb)         Estimated Creatinine Clearance: 6 mL/min (A) (based on SCr of 9.5 mg/dL (HH)).      Thank you for the consult,    Sarthak Dickey [facility]  [id number]

## 2025-03-07 NOTE — PLAN OF CARE
Problem: Pain  Goal: Verbalizes/displays adequate comfort level or baseline comfort level  Outcome: Progressing     Problem: Hematologic - Adult  Goal: Maintains hematologic stability  Outcome: Progressing        03/07/25 0040   Vitals   Temp 99.3 °F (37.4 °C)   Temp Source Oral   Pulse 85   Heart Rate Source Brachial;Radial   Respirations 20   BP (!) 148/82   MAP (Calculated) 104   BP Location Left upper arm   BP Upper/Lower Upper   BP Method Automatic   Patient Position Turns self   Cardiac Rhythm Sinus rhythm   Pain Assessment   Pain Assessment 0-10   Pain Level 0

## 2025-03-08 LAB
ANION GAP SERPL CALCULATED.3IONS-SCNC: 14 MMOL/L (ref 3–16)
BASOPHILS # BLD: 0 K/UL (ref 0–0.2)
BASOPHILS NFR BLD: 0.2 %
BUN SERPL-MCNC: 44 MG/DL (ref 7–20)
CALCIUM SERPL-MCNC: 9 MG/DL (ref 8.3–10.6)
CHLORIDE SERPL-SCNC: 109 MMOL/L (ref 99–110)
CO2 SERPL-SCNC: 21 MMOL/L (ref 21–32)
CREAT SERPL-MCNC: 9.1 MG/DL (ref 0.6–1.1)
DEPRECATED RDW RBC AUTO: 18.9 % (ref 12.4–15.4)
EOSINOPHIL # BLD: 0 K/UL (ref 0–0.6)
EOSINOPHIL NFR BLD: 0 %
GFR SERPLBLD CREATININE-BSD FMLA CKD-EPI: 5 ML/MIN/{1.73_M2}
GLUCOSE SERPL-MCNC: 136 MG/DL (ref 70–99)
HCT VFR BLD AUTO: 34.2 % (ref 36–48)
HGB BLD-MCNC: 11.3 G/DL (ref 12–16)
LYMPHOCYTES # BLD: 0.5 K/UL (ref 1–5.1)
LYMPHOCYTES NFR BLD: 11.1 %
MCH RBC QN AUTO: 28.2 PG (ref 26–34)
MCHC RBC AUTO-ENTMCNC: 33 G/DL (ref 31–36)
MCV RBC AUTO: 85.6 FL (ref 80–100)
MONOCYTES # BLD: 0.2 K/UL (ref 0–1.3)
MONOCYTES NFR BLD: 3.5 %
NEUTROPHILS # BLD: 3.7 K/UL (ref 1.7–7.7)
NEUTROPHILS NFR BLD: 85.2 %
PLATELET # BLD AUTO: 235 K/UL (ref 135–450)
PMV BLD AUTO: 5.8 FL (ref 5–10.5)
POTASSIUM SERPL-SCNC: 4.9 MMOL/L (ref 3.5–5.1)
RBC # BLD AUTO: 4 M/UL (ref 4–5.2)
SODIUM SERPL-SCNC: 144 MMOL/L (ref 136–145)
WBC # BLD AUTO: 4.4 K/UL (ref 4–11)

## 2025-03-08 PROCEDURE — 2060000000 HC ICU INTERMEDIATE R&B

## 2025-03-08 PROCEDURE — 6370000000 HC RX 637 (ALT 250 FOR IP): Performed by: INTERNAL MEDICINE

## 2025-03-08 PROCEDURE — 6370000000 HC RX 637 (ALT 250 FOR IP): Performed by: PHYSICIAN ASSISTANT

## 2025-03-08 PROCEDURE — 80048 BASIC METABOLIC PNL TOTAL CA: CPT

## 2025-03-08 PROCEDURE — 2580000003 HC RX 258: Performed by: PHYSICIAN ASSISTANT

## 2025-03-08 PROCEDURE — 2500000003 HC RX 250 WO HCPCS: Performed by: INTERNAL MEDICINE

## 2025-03-08 PROCEDURE — 97161 PT EVAL LOW COMPLEX 20 MIN: CPT

## 2025-03-08 PROCEDURE — 6360000002 HC RX W HCPCS: Performed by: INTERNAL MEDICINE

## 2025-03-08 PROCEDURE — 97165 OT EVAL LOW COMPLEX 30 MIN: CPT

## 2025-03-08 PROCEDURE — 97530 THERAPEUTIC ACTIVITIES: CPT

## 2025-03-08 PROCEDURE — 2500000003 HC RX 250 WO HCPCS: Performed by: PHYSICIAN ASSISTANT

## 2025-03-08 PROCEDURE — 90945 DIALYSIS ONE EVALUATION: CPT

## 2025-03-08 PROCEDURE — 85025 COMPLETE CBC W/AUTO DIFF WBC: CPT

## 2025-03-08 PROCEDURE — 36415 COLL VENOUS BLD VENIPUNCTURE: CPT

## 2025-03-08 PROCEDURE — 6360000002 HC RX W HCPCS: Performed by: PHYSICIAN ASSISTANT

## 2025-03-08 RX ORDER — PREDNISONE 20 MG/1
40 TABLET ORAL DAILY
Status: COMPLETED | OUTPATIENT
Start: 2025-03-09 | End: 2025-03-12

## 2025-03-08 RX ADMIN — CARVEDILOL 12.5 MG: 6.25 TABLET, FILM COATED ORAL at 08:40

## 2025-03-08 RX ADMIN — Medication 1 CAPSULE: at 08:40

## 2025-03-08 RX ADMIN — AMLODIPINE BESYLATE 10 MG: 5 TABLET ORAL at 08:40

## 2025-03-08 RX ADMIN — AZITHROMYCIN MONOHYDRATE 500 MG: 500 INJECTION, POWDER, LYOPHILIZED, FOR SOLUTION INTRAVENOUS at 21:03

## 2025-03-08 RX ADMIN — SODIUM CHLORIDE, PRESERVATIVE FREE 10 ML: 5 INJECTION INTRAVENOUS at 08:44

## 2025-03-08 RX ADMIN — CALCIUM ACETATE 1334 MG: 667 CAPSULE ORAL at 16:27

## 2025-03-08 RX ADMIN — WATER 40 MG: 1 INJECTION INTRAMUSCULAR; INTRAVENOUS; SUBCUTANEOUS at 08:40

## 2025-03-08 RX ADMIN — SODIUM CHLORIDE, PRESERVATIVE FREE 10 ML: 5 INJECTION INTRAVENOUS at 21:05

## 2025-03-08 RX ADMIN — CARVEDILOL 12.5 MG: 6.25 TABLET, FILM COATED ORAL at 16:27

## 2025-03-08 RX ADMIN — SENNOSIDES 8.6 MG: 8.6 TABLET, COATED ORAL at 21:38

## 2025-03-08 RX ADMIN — CALCIUM ACETATE 1334 MG: 667 CAPSULE ORAL at 12:05

## 2025-03-08 RX ADMIN — WATER 1000 MG: 1 INJECTION INTRAMUSCULAR; INTRAVENOUS; SUBCUTANEOUS at 21:03

## 2025-03-08 RX ADMIN — MORPHINE SULFATE 2 MG: 2 INJECTION, SOLUTION INTRAMUSCULAR; INTRAVENOUS at 08:47

## 2025-03-08 RX ADMIN — Medication 1 CAPSULE: at 16:27

## 2025-03-08 RX ADMIN — CALCIUM ACETATE 1334 MG: 667 CAPSULE ORAL at 08:40

## 2025-03-08 ASSESSMENT — PAIN DESCRIPTION - LOCATION
LOCATION: BACK;CHEST
LOCATION: BACK

## 2025-03-08 ASSESSMENT — PAIN DESCRIPTION - PAIN TYPE: TYPE: ACUTE PAIN

## 2025-03-08 ASSESSMENT — PAIN SCALES - GENERAL
PAINLEVEL_OUTOF10: 5
PAINLEVEL_OUTOF10: 7

## 2025-03-08 ASSESSMENT — PAIN - FUNCTIONAL ASSESSMENT: PAIN_FUNCTIONAL_ASSESSMENT: ACTIVITIES ARE NOT PREVENTED

## 2025-03-08 ASSESSMENT — PAIN DESCRIPTION - ORIENTATION: ORIENTATION: RIGHT

## 2025-03-08 ASSESSMENT — PAIN DESCRIPTION - DESCRIPTORS: DESCRIPTORS: SORE

## 2025-03-08 NOTE — PROGRESS NOTES
[facility] - Inpatient Rehabilitation Department   Phone: [phone]    Occupational Therapy    [x] Initial Evaluation            [] Daily Treatment Note         [x] Discharge Summary      Patient: Donavon Caceres   : 1970   MRN: [id number]   Date of Service:  3/8/2025    Admitting Diagnosis:  Pleural effusion  Current Admission Summary:  Per IM H&P \"54 yo F with ESRD on PD, HTN came to ER with SOB. Admitted as inpatient for PNA with R effusion. Started on IV Abx. Follow by Pulm and Renal. S/P IR R thoracentesis of 1 L on 3/6, felt to be transudative.\"   Past Medical History:  has a past medical history of Cerebral artery occlusion with cerebral infarction (HCC), History of anemia, Hypertension, and Wears glasses.  Past Surgical History:  has a past surgical history that includes fracture surgery (Right) and Fibula Fracture Surgery (Right, 2022).    Discharge Recommendations: Donavon Caceres scored a 24/24 on the AM-PAC ADL Inpatient form.  At this time, no further OT is recommended upon discharge due to patient at independent level.  Recommend patient returns to prior setting with prior services.      DME Required For Discharge: No DME required    Precautions/Restrictions: no restrictions  Positional Restrictions:no positional restrictions    Pre-Admission Information   Lives With: spouse, brother                Type of Home: house  Home Layout: tri-level, bedroom/bathroom upstairs  Home Access: 12 step to enter with handrail.  Handrails are located on L side.  Bathroom Layout: tub/shower unit  Bathroom Equipment: grab bars in shower, hand held shower head  Toilet Height: standard height  Home Equipment: peritoneal dialysis material  Transfer Assistance: Independent without use of device  Ambulation Assistance:Independent without use of device  ADL Assistance: independent with all ADL's  IADL Assistance: independent with homemaking tasks  Active :        [x] Yes                 []

## 2025-03-08 NOTE — PLAN OF CARE
Problem: Chronic Conditions and Co-morbidities  Goal: Patient's chronic conditions and co-morbidity symptoms are monitored and maintained or improved  Outcome: Progressing  Flowsheets (Taken 3/7/2025 1905)  Care Plan - Patient's Chronic Conditions and Co-Morbidity Symptoms are Monitored and Maintained or Improved:   Monitor and assess patient's chronic conditions and comorbid symptoms for stability, deterioration, or improvement   Update acute care plan with appropriate goals if chronic or comorbid symptoms are exacerbated and prevent overall improvement and discharge   Collaborate with multidisciplinary team to address chronic and comorbid conditions and prevent exacerbation or deterioration     Problem: Discharge Planning  Goal: Discharge to home or other facility with appropriate resources  Outcome: Progressing  Flowsheets (Taken 3/7/2025 1905)  Discharge to home or other facility with appropriate resources:   Identify barriers to discharge with patient and caregiver   Arrange for needed discharge resources and transportation as appropriate   Identify discharge learning needs (meds, wound care, etc)   Refer to discharge planning if patient needs post-hospital services based on physician order or complex needs related to functional status, cognitive ability or social support system     Problem: Pain  Goal: Verbalizes/displays adequate comfort level or baseline comfort level  Outcome: Progressing     Problem: Hematologic - Adult  Goal: Maintains hematologic stability  Outcome: Progressing  Flowsheets (Taken 3/7/2025 1905)  Maintains hematologic stability:   Monitor labs for bleeding or clotting disorders   Assess for signs and symptoms of bleeding or hemorrhage   Administer blood products/factors as ordered     Problem: Respiratory - Adult  Goal: Achieves optimal ventilation and oxygenation  Outcome: Progressing     Problem: Safety - Adult  Goal: Free from fall injury  Outcome: Progressing

## 2025-03-08 NOTE — PROGRESS NOTES
Hospitalist Progress Note      PCP: No primary care provider on file.    Date of Admission: 3/5/2025    Chief Complaint: SOB    Hospital Course: 54 yo F with ESRD on PD, HTN came to ER with SOB.  Admitted as inpatient for PNA with R effusion.  Started on IV Abx.  Follow by Pulm and Renal. S/P IR R thoracentesis of 1 L on 3/6, felt to be transudative.      Echo   Left Ventricle: Normal left ventricular systolic function with a visually estimated EF of 55 - 60%. Left ventricle size is normal. Mildly increased wall thickness. Findings consistent with mild concentric hypertrophy. Normal wall motion. Diastolic dysfunction present with normal LV EF.    Right Ventricle: Right ventricle size is normal. Normal systolic function.    Aortic Valve: Not well visualized. Mildly thickened cusps. Mild regurgitation.    Tricuspid Valve: Unable to assess RVSP due to inadequate or insignificant tricuspid regurgitation.    Pericardium: Trivial pericardial effusion present. No indication of cardiac tamponade.    Extracardiac: Pleural effusion.    Image quality is technically difficult. Procedure performed with the patient in a supine position..      Viral panel negative.    PNA panel with Staph aureus, Strep pneumoniae, Mycoplasma pneumoniae and rhinovirus.  Vanco IV added given staph.  Respiratory cx has been requested.    Subjective: Overall breathing improved  Does not require any oxygen  Still has some dry cough  No chest pain      Medications:  Reviewed      Intake/Output Summary (Last 24 hours) at 3/8/2025 1353  Last data filed at 3/7/2025 1400  Gross per 24 hour   Intake 550 ml   Output --   Net 550 ml       Physical Exam Performed:    BP (!) 150/95   Pulse 75   Temp 97.9 °F (36.6 °C) (Oral)   Resp 18   Ht 1.524 m (5')   Wt 68.9 kg (151 lb 14.4 oz)   SpO2 97%   BMI 29.67 kg/m²     General appearance: No apparent distress, appears stated age and cooperative.  HEENT: Pupils equal, round, and reactive to light.  infiltrates   that could represent a combination of atelectasis and/or pneumonia             IP CONSULT TO NEPHROLOGY  IP CONSULT TO INTERVENTIONAL RADIOLOGY  IP CONSULT TO NEPHROLOGY  IP CONSULT TO PULMONOLOGY  IP CONSULT TO PHARMACY    Assessment/Plan:    55-year-old admitted to the hospital with dyspnea    Pneumonia Strep and mycoplasma econdary to underlying human rhino virus and enterovirus infection  Staph aureus mecA gene not detected   Started on ceftriaxone and azithromycin  Also started on methylprednisone  Minimal wheeze on exam  No oxygen requirement  If continues to do well can change over to Augmentin and azithromycin to complete a total 7-day course  Change over to prednisone 40 mg to complete a total 5-day course    Right pleural effusion s/p thoracentesis  Remove 1 L of fluid  Transudative  Secondary to volume overload due to underlying end-stage renal disease on peritoneal dialysis    End-stage renal disease on PD  Continue peritoneal dialysis nephrology is following    Hypertension  Continue Norvasc    If doing well discharge tomorrow      DVT Prophylaxis: Hep SQ  Diet: ADULT DIET; Regular  Code Status: Full Code  PT/OT Eval Status: Requested    Dispo - Home    Discussed with patient, nursing and CM.    Discussed with Dr Brand (Pulm).  Get resp cx.  Needs volume control per Renal.  Might be best for HD.    Discussed with son.    Virgen Goode MD

## 2025-03-08 NOTE — PROGRESS NOTES
[facility] - Inpatient Rehabilitation Department   Phone: [phone]    Physical Therapy    [x] Initial Evaluation            [] Daily Treatment Note         [x] Discharge Summary      Patient: Donavon Caceres   : 1970   MRN: [id number]   Date of Service:  3/8/2025  Admitting Diagnosis: Pleural effusion  Current Admission Summary: Per IM H&P \"54 yo F with ESRD on PD, HTN came to ER with SOB. Admitted as inpatient for PNA with R effusion. Started on IV Abx. Follow by Pulm and Renal. S/P IR R thoracentesis of 1 L on 3/6, felt to be transudative.\"  Past Medical History:  has a past medical history of Cerebral artery occlusion with cerebral infarction (HCC), History of anemia, Hypertension, and Wears glasses.  Past Surgical History:  has a past surgical history that includes fracture surgery (Right) and Fibula Fracture Surgery (Right, 2022).  Discharge Recommendations: Donavon Caceres scored a 24/24 on the AM-PAC short mobility form.  At this time, no further PT is recommended upon discharge due to patient at independent level.  Recommend patient returns to prior setting with prior services.    DME Required For Discharge: No new DME required  Precautions/Restrictions: no restrictions  Positional Restrictions:no positional restrictions    Pre-Admission Information   Lives With: spouse, brother    Type of Home: house  Home Layout: tri-level, bedroom/bathroom upstairs  Home Access: 12 step to enter with handrail.  Handrails are located on L side.  Bathroom Layout: tub/shower unit  Bathroom Equipment: grab bars in shower, hand held shower head  Toilet Height: standard height  Home Equipment: peritoneal dialysis material  Transfer Assistance: Independent without use of device  Ambulation Assistance:Independent without use of device  ADL Assistance: independent with all ADL's  IADL Assistance: independent with homemaking tasks  Active :        [x] Yes  [] No  Hand Dominance: [] Left  [x]      Functional Outcomes  AM-PAC Inpatient Mobility Raw Score : 24              Cognition  WFL  Orientation:    A&O x 4    Education  Barriers To Learning: none  Patient Education: patient educated on goals, PT role and benefits, plan of care, HEP, general safety, energy conservation, discharge recommendations  Learning Assessment:  Pt verbalized and demonstrates understanding    Assessment  Activity Tolerance: Slight shortness of breath on exertion (walk and talk) though SpO2 on spot check at or above 95%.  Impairments Requiring Therapeutic Intervention: none - eval with same day discharge  Prognosis: good without need for therapy intervention  Clinical Assessment: Patient presenting at independent level for completion of required mobility tasks for return to home.  Eval with d/c at this time.  No therapy services indicated.    Safety Interventions: patient left in chair, call light within reach, gait belt, patient at risk for falls, nurse notified, and patient up ad noam     Plan  Frequency: Eval with same day discharge.  No follow up required.  Current Treatment Recommendations: Not applicable, evaluation completed with same day discharge.    Goals  Patient eval with same day discharge.  No goals set as patient is at baseline mobility status.      Therapy Session Time      Individual Group Co-treatment   Time In     1309   Time Out     1335   Minutes     26     Timed Code Treatment Minutes:  11 Minutes  Total Treatment Minutes:  26 Minutes       Electronically Signed By: Tami Cartagena, PT    Thanks, Tami Cartagena PT, DPT #[id number] 3/8/2025

## 2025-03-08 NOTE — PROGRESS NOTES
Treatment time:  9 Hours 14 minutes  Net UF:  421 ml  Dwell Time:  48 minutes Gained     Treatment completed without complications or complaints from patient. Effluent clear yellow and lines taped to patient per protocol. Patient resting comfortably with VSS upon exiting room.      Copy of dialysis treatment record placed in chart, to be scanned into EMR and report given to RN.

## 2025-03-08 NOTE — PROGRESS NOTES
MD Dejan Aguilar MD Aldo Estella, DO                 Office: [phone]                      Fax: [phone]             [employer]                   NEPHROLOGY CONSULT PROGRESS NOTE    Subjective / interval history / medical decision making.  -tolerated PD well overnight. UF 421ml. No new complaints.    IMPRESSION/RECOMMENDATIONS:      #ESKD on CCPD nightly  -follows with  nephrology, Dr. Alcazar is her nephrologist.  -compliant with nightly treatments.  -EDW 66.7kg reported.  -per patient Rx 6L total, 1.5% dialysate bags (prior to constipation issues), 9 hours total tx time  -home prescription, but alternative 2.5% and 1.5% dialysate bags. 4 exchanges.  -adjust for increased UF: all bags 2.5% (green) for ongoing CCPD while inpatient. At discharge, return to alternating green and yellow bags.    #hypervolemia   -slightly volume up. Improving on CCPD    #pleural effusion  -1L removed per IR thoracentesis 3/6/25.  -pulm following.  '  #HTN  -resume home antihypertensives  -stable    #AGMA  -likely 2/2 ESKD, PNA    #anemia of CKD  -stable, at goal 10.5-11. Monitor.    #PNA - management per primary team      Thank you for the consult.  We will follow this patient along the hospitalization.  Rajat Crabtree DO     High complexity, at risk of impending organ failure needing  higher level of care/monitoring.   Time spent 35 minutes that included face-to-face meeting/discussion with patient, and treatment team (including primary/referring team and other consultants; included coordination of care with the treatment team; and review of patient's electronic medical records and ordering appropriates tests.             Reason for Consult:  ESKD management  Requesting Physician: Grzegorz Espinoza MD     CHIEF COMPLAINT:  sob    History obtained from records and patient.    HISTORY OF PRESENT ILLNESS:                Donavon Caceres  is 55 y.o. y.o. female with significant past medical history of ESKD

## 2025-03-09 LAB
ANION GAP SERPL CALCULATED.3IONS-SCNC: 16 MMOL/L (ref 3–16)
BACTERIA FLD AEROBE CULT: NORMAL
BASOPHILS # BLD: 0 K/UL (ref 0–0.2)
BASOPHILS NFR BLD: 0.1 %
BUN SERPL-MCNC: 49 MG/DL (ref 7–20)
CALCIUM SERPL-MCNC: 8.8 MG/DL (ref 8.3–10.6)
CHLORIDE SERPL-SCNC: 106 MMOL/L (ref 99–110)
CO2 SERPL-SCNC: 19 MMOL/L (ref 21–32)
CREAT SERPL-MCNC: 9.3 MG/DL (ref 0.6–1.1)
DEPRECATED RDW RBC AUTO: 19.2 % (ref 12.4–15.4)
EOSINOPHIL # BLD: 0 K/UL (ref 0–0.6)
EOSINOPHIL NFR BLD: 0 %
GFR SERPLBLD CREATININE-BSD FMLA CKD-EPI: 5 ML/MIN/{1.73_M2}
GLUCOSE SERPL-MCNC: 157 MG/DL (ref 70–99)
GRAM STN SPEC: NORMAL
HCT VFR BLD AUTO: 35.5 % (ref 36–48)
HGB BLD-MCNC: 11.7 G/DL (ref 12–16)
LYMPHOCYTES # BLD: 0.8 K/UL (ref 1–5.1)
LYMPHOCYTES NFR BLD: 9.3 %
MCH RBC QN AUTO: 28.2 PG (ref 26–34)
MCHC RBC AUTO-ENTMCNC: 33 G/DL (ref 31–36)
MCV RBC AUTO: 85.4 FL (ref 80–100)
MONOCYTES # BLD: 0.7 K/UL (ref 0–1.3)
MONOCYTES NFR BLD: 8 %
MRSA SPEC QL CULT: NORMAL
NEUTROPHILS # BLD: 7.3 K/UL (ref 1.7–7.7)
NEUTROPHILS NFR BLD: 82.6 %
PLATELET # BLD AUTO: 285 K/UL (ref 135–450)
PMV BLD AUTO: 6 FL (ref 5–10.5)
POTASSIUM SERPL-SCNC: 4.4 MMOL/L (ref 3.5–5.1)
RBC # BLD AUTO: 4.16 M/UL (ref 4–5.2)
SODIUM SERPL-SCNC: 141 MMOL/L (ref 136–145)
VANCOMYCIN SERPL-MCNC: 25.3 UG/ML
WBC # BLD AUTO: 8.8 K/UL (ref 4–11)

## 2025-03-09 PROCEDURE — 2500000003 HC RX 250 WO HCPCS: Performed by: PHYSICIAN ASSISTANT

## 2025-03-09 PROCEDURE — 36415 COLL VENOUS BLD VENIPUNCTURE: CPT

## 2025-03-09 PROCEDURE — 6370000000 HC RX 637 (ALT 250 FOR IP): Performed by: INTERNAL MEDICINE

## 2025-03-09 PROCEDURE — 85025 COMPLETE CBC W/AUTO DIFF WBC: CPT

## 2025-03-09 PROCEDURE — 2500000003 HC RX 250 WO HCPCS: Performed by: INTERNAL MEDICINE

## 2025-03-09 PROCEDURE — 2580000003 HC RX 258: Performed by: PHYSICIAN ASSISTANT

## 2025-03-09 PROCEDURE — 6370000000 HC RX 637 (ALT 250 FOR IP): Performed by: PHYSICIAN ASSISTANT

## 2025-03-09 PROCEDURE — 90945 DIALYSIS ONE EVALUATION: CPT

## 2025-03-09 PROCEDURE — 6370000000 HC RX 637 (ALT 250 FOR IP)

## 2025-03-09 PROCEDURE — 80048 BASIC METABOLIC PNL TOTAL CA: CPT

## 2025-03-09 PROCEDURE — 80202 ASSAY OF VANCOMYCIN: CPT

## 2025-03-09 PROCEDURE — 2060000000 HC ICU INTERMEDIATE R&B

## 2025-03-09 PROCEDURE — 6360000002 HC RX W HCPCS: Performed by: PHYSICIAN ASSISTANT

## 2025-03-09 PROCEDURE — 94640 AIRWAY INHALATION TREATMENT: CPT

## 2025-03-09 RX ORDER — MUPIROCIN 20 MG/G
OINTMENT TOPICAL
Status: DISCONTINUED | OUTPATIENT
Start: 2025-03-09 | End: 2025-03-09

## 2025-03-09 RX ORDER — IPRATROPIUM BROMIDE AND ALBUTEROL SULFATE 2.5; .5 MG/3ML; MG/3ML
1 SOLUTION RESPIRATORY (INHALATION)
Status: DISCONTINUED | OUTPATIENT
Start: 2025-03-09 | End: 2025-03-12

## 2025-03-09 RX ADMIN — Medication 1 CAPSULE: at 17:04

## 2025-03-09 RX ADMIN — CARVEDILOL 12.5 MG: 6.25 TABLET, FILM COATED ORAL at 08:10

## 2025-03-09 RX ADMIN — WATER 1000 MG: 1 INJECTION INTRAMUSCULAR; INTRAVENOUS; SUBCUTANEOUS at 19:40

## 2025-03-09 RX ADMIN — CALCIUM ACETATE 1334 MG: 667 CAPSULE ORAL at 17:05

## 2025-03-09 RX ADMIN — IPRATROPIUM BROMIDE AND ALBUTEROL SULFATE 1 DOSE: .5; 3 SOLUTION RESPIRATORY (INHALATION) at 22:03

## 2025-03-09 RX ADMIN — CALCIUM ACETATE 1334 MG: 667 CAPSULE ORAL at 12:55

## 2025-03-09 RX ADMIN — SODIUM CHLORIDE, PRESERVATIVE FREE 10 ML: 5 INJECTION INTRAVENOUS at 08:10

## 2025-03-09 RX ADMIN — CARVEDILOL 12.5 MG: 6.25 TABLET, FILM COATED ORAL at 17:04

## 2025-03-09 RX ADMIN — AMLODIPINE BESYLATE 10 MG: 5 TABLET ORAL at 08:09

## 2025-03-09 RX ADMIN — AZITHROMYCIN MONOHYDRATE 500 MG: 500 INJECTION, POWDER, LYOPHILIZED, FOR SOLUTION INTRAVENOUS at 19:39

## 2025-03-09 RX ADMIN — PREDNISONE 40 MG: 20 TABLET ORAL at 08:10

## 2025-03-09 RX ADMIN — Medication 1 CAPSULE: at 08:09

## 2025-03-09 ASSESSMENT — PAIN SCALES - GENERAL: PAINLEVEL_OUTOF10: 0

## 2025-03-09 NOTE — PROGRESS NOTES
CCPD Order   Exchanges: 4   Exchange Volume: 1500 ml   Total Time: 9 hrs   Dextrose: 2.5%   Last Fill: 0 ml   Total Volume: 6000 ml     Orders verified. Supplies taken to pt's room. Report received. Cycler set up, primed and pre tested. [facility]. Pt connected to cycler. CCPD initiated without problem. Initial effluent clear.       If problems should arise please call the [phone] on top of PD cycler machine.

## 2025-03-09 NOTE — PROGRESS NOTES
MD Dejan Aguilar MD Aldo Estella, DO                 Office: [phone]                      Fax: [phone]             Ofelia Feliz                   NEPHROLOGY CONSULT PROGRESS NOTE    Subjective / interval history / medical decision making.  -tolerated PD well overnight. UF 640ml. Says breathing feels better after PD but still not at baseline. No new complaints.    IMPRESSION/RECOMMENDATIONS:      #ESKD on CCPD nightly  -follows with  nephrology, Dr. Alcazar is her nephrologist.  -compliant with nightly treatments.  -EDW 66.7kg reported.  -per patient Rx 6L total, 1.5% dialysate bags (prior to constipation issues), 9 hours total tx time  -home prescription, but alternative 2.5% and 1.5% dialysate bags. 4 exchanges.  -adjust for increased UF: all bags 2.5% (green) for ongoing CCPD while inpatient. At discharge, return to alternating green and yellow bags.  -d/w patient to follow-up with her home dialysis nurse tomorrow.    #hypervolemia   -slightly volume up. Improving on CCPD    #pleural effusion  -1L removed per IR thoracentesis 3/6/25.  -pulm following.  '  #HTN  -resume home antihypertensives  -stable    #AGMA  -likely 2/2 ESKD, PNA    #anemia of CKD  -stable, at goal 10.5-11. Monitor.    #PNA - management per primary team      Thank you for the consult.  We will follow this patient along the hospitalization.  Rajat Crabtree DO     High complexity, at risk of impending organ failure needing  higher level of care/monitoring.   Time spent 35 minutes that included face-to-face meeting/discussion with patient, and treatment team (including primary/referring team and other consultants; included coordination of care with the treatment team; and review of patient's electronic medical records and ordering appropriates tests.       Dispo-stable from renal standpoint. Follow-up with her home dialysis unit Monday, d/w patient.      Reason for Consult:  ESKD management  Requesting Physician:  HCT 35.5 03/09/2025 04:37 AM    MCV 85.4 03/09/2025 04:37 AM    MCH 28.2 03/09/2025 04:37 AM    MCHC 33.0 03/09/2025 04:37 AM    RDW 19.2 03/09/2025 04:37 AM     03/09/2025 04:37 AM    MPV 6.0 03/09/2025 04:37 AM     BMP:   Lab Results   Component Value Date/Time     03/09/2025 04:37 AM    K 4.4 03/09/2025 04:37 AM    K 4.9 03/08/2025 05:21 AM     03/09/2025 04:37 AM    CO2 19 03/09/2025 04:37 AM    BUN 49 03/09/2025 04:37 AM    CREATININE 9.3 03/09/2025 04:37 AM    CALCIUM 8.8 03/09/2025 04:37 AM    GFRAA 37 08/20/2014 04:50 PM    LABGLOM 5 03/09/2025 04:37 AM    GLUCOSE 157 03/09/2025 04:37 AM   Magnesium:  No results found for: \"MG\"Phosphorus:  No results found for: \"PHOS\"

## 2025-03-09 NOTE — PROGRESS NOTES
Hospitalist Progress Note      PCP: No primary care provider on file.    Date of Admission: 3/5/2025      Hospital Course: This 56 yo F with ESRD on PD and hypertension who presented with SOB.  Admitted as inpatient for PNA with R effusion.  Started on IV Abx.  Follow by Pulm and Renal. S/P IR R thoracentesis of 1 L on 3/6, felt to be transudative.  Viral panel negative.PNA panel with Staph aureus, Strep pneumoniae, Mycoplasma pneumoniae and rhinovirus      Interval history:  Pt seen and examined today.  Overnight events noted, interval ancillary notes and labs reviewed.   On RA satting well.  Afebrile night, WBCs 8.8K  Elevated BP this morning; instructed RN to give BP meds and recheck blood pressure  Resting in bed; reported she is not feeling well today having difficulty breathing   Denies any fever, chills, chest pain, nausea, vomiting or abdominal pain  Instructed RN to call respiratory therapist for breathing treatment      Assessment/Plan:      Strep pneumo and mycoplasma pneumonia   Molecular panel positive for staph RES Streptococcus pneumonia, mycoplasma pneumonia and hemorhino/enterovirusStaph aureus mecA gene not detected.  MRSA nares negative  Pulmonology consulted;  on ceftriaxone and azithromycin. Continue bronchodilators and antitussives      Right pleural effusion s/p thoracentesis;   IR consulted; s/p about 1 L of fluid removed.  Fluid analysis lymphocytic predominant and has transudative characteristic likely 2/2 volume overload from ESRD  CXR after thoracentesis showed right lung airspace disease likely pneumonia vs pulmonary edema    Acute bronchiolitis 2/2 rhino/enterovirus.   Neurology input appreciated; started on steroids.  Continue breathing treatment and supportive care    Acute respiratory distress/hypoxic respiratory failure secondary to some; improving  Supplemental O2 to keep sats between 88 to 92%    End-stage renal disease on PD; nephrology consulted for PD  Continue peritoneal  opacification of the right hemithorax.         IR GUIDED THORACENTESIS PLEURAL   Final Result   Successful ultrasound guided right thoracentesis.         XR CHEST 1 VIEW   Final Result   Status post right thoracentesis with elimination of right pleural effusion.   No evidence of pneumothorax.         XR CHEST (2 VW)   Final Result   Moderate right pleural effusion with right lower lobe airspace infiltrates   that could represent a combination of atelectasis and/or pneumonia             IP CONSULT TO NEPHROLOGY  IP CONSULT TO INTERVENTIONAL RADIOLOGY  IP CONSULT TO NEPHROLOGY  IP CONSULT TO PULMONOLOGY  IP CONSULT TO PHARMACY            SYLVIA PHOENXI MD

## 2025-03-09 NOTE — PROGRESS NOTES
Clinical Pharmacy Note: Pharmacy to Dose Vancomycin    Vancomycin Day: 3  Indication: PNA  Current Dose: pulse dosing for peritoneal dialysis  Dosing Method: Dosing by random levels    Random: 25.3    Recent Labs     03/08/25  0521 03/09/25  0437   BUN 44* 49*       Recent Labs     03/08/25  0521 03/09/25  0437   CREATININE 9.1* 9.3*       Recent Labs     03/08/25  0521 03/09/25  0437   WBC 4.4 8.8       No intake or output data in the 24 hours ending 03/09/25 0737      Ht Readings from Last 1 Encounters:   03/06/25 1.524 m (5')        Wt Readings from Last 1 Encounters:   03/08/25 68.9 kg (151 lb 14.4 oz)         Body mass index is 29.67 kg/m².    Estimated Creatinine Clearance: 6 mL/min (A) (based on SCr of 9.3 mg/dL (HH)).      Assessment/Plan:  Vancomycin level is supratherapeutic.  Will hold off on giving anymore vancomycin and re-check a level in 3 days  A vancomycin random level has been ordered on 3/12 at 0600 for follow-up.  MRSA nares still pending  Daily BMP ordered  Changes in regimen will be determined based on culture results, renal function, and clinical response.  Pharmacy will continue to monitor and adjust regimen as necessary.    Thank you for the consult,    Delma Jessica, PharmD   [id number]

## 2025-03-09 NOTE — PROGRESS NOTES
Treatment time: 8 Hours 54 minutes  Net UF: 640 ml  Dwell Time: 43 minutes gained    Treatment completed without complications or complaints from patient. Effluent clear and lines taped to patient per protocol. Patient resting comfortably with VSS upon exiting room.      Copy of dialysis treatment record placed in chart, to be scanned into EMR and report given to Kiana Morrell RN.

## 2025-03-09 NOTE — PROGRESS NOTES
03/08/25 2689   Treatment   Treatment Type IS   Incentive Spirometry Tx   Treatment Effort Needs encouragement   Predicted Volume 478   Maximum Achieved Volume (mL) 250 mL   Number of Breaths 10

## 2025-03-09 NOTE — PLAN OF CARE
Problem: Chronic Conditions and Co-morbidities  Goal: Patient's chronic conditions and co-morbidity symptoms are monitored and maintained or improved  Outcome: Progressing  Flowsheets (Taken 3/9/2025 0800)  Care Plan - Patient's Chronic Conditions and Co-Morbidity Symptoms are Monitored and Maintained or Improved: Monitor and assess patient's chronic conditions and comorbid symptoms for stability, deterioration, or improvement     Problem: Discharge Planning  Goal: Discharge to home or other facility with appropriate resources  Outcome: Progressing  Flowsheets (Taken 3/9/2025 0800)  Discharge to home or other facility with appropriate resources: Identify barriers to discharge with patient and caregiver     Problem: Pain  Goal: Verbalizes/displays adequate comfort level or baseline comfort level  Outcome: Progressing     Problem: Hematologic - Adult  Goal: Maintains hematologic stability  Outcome: Progressing  Flowsheets (Taken 3/9/2025 0800)  Maintains hematologic stability: Assess for signs and symptoms of bleeding or hemorrhage     Problem: Respiratory - Adult  Goal: Achieves optimal ventilation and oxygenation  Outcome: Progressing  Flowsheets (Taken 3/9/2025 0800)  Achieves optimal ventilation and oxygenation: Assess for changes in respiratory status     Problem: Safety - Adult  Goal: Free from fall injury  Outcome: Progressing

## 2025-03-10 ENCOUNTER — APPOINTMENT (OUTPATIENT)
Dept: GENERAL RADIOLOGY | Age: 55
DRG: 177 | End: 2025-03-10
Payer: MEDICARE

## 2025-03-10 ENCOUNTER — APPOINTMENT (OUTPATIENT)
Dept: INTERVENTIONAL RADIOLOGY/VASCULAR | Age: 55
DRG: 177 | End: 2025-03-10
Payer: MEDICARE

## 2025-03-10 PROBLEM — Z99.2 ESRD (END STAGE RENAL DISEASE) ON DIALYSIS (HCC): Status: ACTIVE | Noted: 2025-03-10

## 2025-03-10 PROBLEM — I45.5 SINUS PAUSE: Status: ACTIVE | Noted: 2025-03-10

## 2025-03-10 PROBLEM — N18.6 ESRD (END STAGE RENAL DISEASE) ON DIALYSIS (HCC): Status: ACTIVE | Noted: 2025-03-10

## 2025-03-10 PROBLEM — J18.9 PNEUMONIA DUE TO INFECTIOUS ORGANISM: Status: ACTIVE | Noted: 2025-03-10

## 2025-03-10 LAB
ANION GAP SERPL CALCULATED.3IONS-SCNC: 16 MMOL/L (ref 3–16)
ANTI-XA UNFRAC HEPARIN: <0.1 IU/ML (ref 0.3–0.7)
APPEARANCE FLUID: CLEAR
BDY FLUID QUALITY: NORMAL
BUN SERPL-MCNC: 59 MG/DL (ref 7–20)
CALCIUM SERPL-MCNC: 8.9 MG/DL (ref 8.3–10.6)
CELL COUNT FLUID TYPE: NORMAL
CHLORIDE SERPL-SCNC: 109 MMOL/L (ref 99–110)
CO2 SERPL-SCNC: 21 MMOL/L (ref 21–32)
COLOR FLUID: COLORLESS
CREAT SERPL-MCNC: 9 MG/DL (ref 0.6–1.1)
EOSINOPHIL NFR FLD MANUAL: 1 %
GFR SERPLBLD CREATININE-BSD FMLA CKD-EPI: 5 ML/MIN/{1.73_M2}
GLUCOSE BLD-MCNC: 109 MG/DL (ref 70–99)
GLUCOSE FLD-MCNC: 196 MG/DL
GLUCOSE SERPL-MCNC: 128 MG/DL (ref 70–99)
LDH FLD L TO P-CCNC: 41 U/L
LDH SERPL L TO P-CCNC: 286 U/L (ref 100–190)
LYMPHOCYTES NFR FLD: 25 %
MACROPHAGES # FLD: 23 %
MESOTHL CELL NFR FLD: 2 %
MONOCYTES NFR FLD: 38 %
NEUTROPHIL, FLUID: 11 %
NUC CELL # FLD: 85 /CUMM
PERFORMED ON: ABNORMAL
POTASSIUM SERPL-SCNC: 4.6 MMOL/L (ref 3.5–5.1)
PROT FLD-MCNC: 0.4 G/DL
PROT SERPL-MCNC: 7.3 G/DL (ref 6.4–8.2)
RBC FLUID: <1000 /CUMM
SODIUM SERPL-SCNC: 146 MMOL/L (ref 136–145)
TOTAL CELLS COUNTED FLD: 100
TSH SERPL DL<=0.005 MIU/L-ACNC: 0.44 UIU/ML (ref 0.27–4.2)

## 2025-03-10 PROCEDURE — 84443 ASSAY THYROID STIM HORMONE: CPT

## 2025-03-10 PROCEDURE — 0W993ZZ DRAINAGE OF RIGHT PLEURAL CAVITY, PERCUTANEOUS APPROACH: ICD-10-PCS | Performed by: STUDENT IN AN ORGANIZED HEALTH CARE EDUCATION/TRAINING PROGRAM

## 2025-03-10 PROCEDURE — 88305 TISSUE EXAM BY PATHOLOGIST: CPT

## 2025-03-10 PROCEDURE — 6370000000 HC RX 637 (ALT 250 FOR IP): Performed by: INTERNAL MEDICINE

## 2025-03-10 PROCEDURE — 84155 ASSAY OF PROTEIN SERUM: CPT

## 2025-03-10 PROCEDURE — 84157 ASSAY OF PROTEIN OTHER: CPT

## 2025-03-10 PROCEDURE — 36415 COLL VENOUS BLD VENIPUNCTURE: CPT

## 2025-03-10 PROCEDURE — 2060000000 HC ICU INTERMEDIATE R&B

## 2025-03-10 PROCEDURE — 80048 BASIC METABOLIC PNL TOTAL CA: CPT

## 2025-03-10 PROCEDURE — 99233 SBSQ HOSP IP/OBS HIGH 50: CPT | Performed by: STUDENT IN AN ORGANIZED HEALTH CARE EDUCATION/TRAINING PROGRAM

## 2025-03-10 PROCEDURE — 2500000003 HC RX 250 WO HCPCS: Performed by: PHYSICIAN ASSISTANT

## 2025-03-10 PROCEDURE — 2580000003 HC RX 258: Performed by: PHYSICIAN ASSISTANT

## 2025-03-10 PROCEDURE — 82945 GLUCOSE OTHER FLUID: CPT

## 2025-03-10 PROCEDURE — 6360000002 HC RX W HCPCS: Performed by: PHYSICIAN ASSISTANT

## 2025-03-10 PROCEDURE — 2500000003 HC RX 250 WO HCPCS: Performed by: INTERNAL MEDICINE

## 2025-03-10 PROCEDURE — 84478 ASSAY OF TRIGLYCERIDES: CPT

## 2025-03-10 PROCEDURE — 94640 AIRWAY INHALATION TREATMENT: CPT

## 2025-03-10 PROCEDURE — 89051 BODY FLUID CELL COUNT: CPT

## 2025-03-10 PROCEDURE — 94761 N-INVAS EAR/PLS OXIMETRY MLT: CPT

## 2025-03-10 PROCEDURE — C1729 CATH, DRAINAGE: HCPCS

## 2025-03-10 PROCEDURE — 6370000000 HC RX 637 (ALT 250 FOR IP)

## 2025-03-10 PROCEDURE — 83615 LACTATE (LD) (LDH) ENZYME: CPT

## 2025-03-10 PROCEDURE — 6370000000 HC RX 637 (ALT 250 FOR IP): Performed by: PHYSICIAN ASSISTANT

## 2025-03-10 PROCEDURE — 85520 HEPARIN ASSAY: CPT

## 2025-03-10 PROCEDURE — 32555 ASPIRATE PLEURA W/ IMAGING: CPT

## 2025-03-10 PROCEDURE — 88112 CYTOPATH CELL ENHANCE TECH: CPT

## 2025-03-10 PROCEDURE — 6360000002 HC RX W HCPCS: Performed by: INTERNAL MEDICINE

## 2025-03-10 PROCEDURE — 71045 X-RAY EXAM CHEST 1 VIEW: CPT

## 2025-03-10 PROCEDURE — 99222 1ST HOSP IP/OBS MODERATE 55: CPT | Performed by: INTERNAL MEDICINE

## 2025-03-10 RX ORDER — POLYETHYLENE GLYCOL 3350 17 G/17G
17 POWDER, FOR SOLUTION ORAL DAILY PRN
Status: DISCONTINUED | OUTPATIENT
Start: 2025-03-10 | End: 2025-03-13 | Stop reason: HOSPADM

## 2025-03-10 RX ORDER — LIDOCAINE HYDROCHLORIDE 10 MG/ML
10 INJECTION, SOLUTION EPIDURAL; INFILTRATION; INTRACAUDAL; PERINEURAL ONCE
Status: COMPLETED | OUTPATIENT
Start: 2025-03-10 | End: 2025-03-10

## 2025-03-10 RX ADMIN — IPRATROPIUM BROMIDE AND ALBUTEROL SULFATE 1 DOSE: .5; 3 SOLUTION RESPIRATORY (INHALATION) at 12:38

## 2025-03-10 RX ADMIN — POLYETHYLENE GLYCOL 3350 17 G: 17 POWDER, FOR SOLUTION ORAL at 12:46

## 2025-03-10 RX ADMIN — CARVEDILOL 12.5 MG: 6.25 TABLET, FILM COATED ORAL at 16:50

## 2025-03-10 RX ADMIN — CALCIUM ACETATE 1334 MG: 667 CAPSULE ORAL at 09:00

## 2025-03-10 RX ADMIN — SODIUM CHLORIDE, PRESERVATIVE FREE 10 ML: 5 INJECTION INTRAVENOUS at 09:00

## 2025-03-10 RX ADMIN — AMLODIPINE BESYLATE 10 MG: 5 TABLET ORAL at 08:59

## 2025-03-10 RX ADMIN — AZITHROMYCIN MONOHYDRATE 500 MG: 500 INJECTION, POWDER, LYOPHILIZED, FOR SOLUTION INTRAVENOUS at 21:31

## 2025-03-10 RX ADMIN — SODIUM CHLORIDE, PRESERVATIVE FREE 10 ML: 5 INJECTION INTRAVENOUS at 21:05

## 2025-03-10 RX ADMIN — CARVEDILOL 12.5 MG: 6.25 TABLET, FILM COATED ORAL at 08:59

## 2025-03-10 RX ADMIN — Medication 1 CAPSULE: at 16:50

## 2025-03-10 RX ADMIN — IPRATROPIUM BROMIDE AND ALBUTEROL SULFATE 1 DOSE: .5; 3 SOLUTION RESPIRATORY (INHALATION) at 15:46

## 2025-03-10 RX ADMIN — LIDOCAINE HYDROCHLORIDE 10 ML: 10 INJECTION, SOLUTION EPIDURAL; INFILTRATION; INTRACAUDAL; PERINEURAL at 15:23

## 2025-03-10 RX ADMIN — IPRATROPIUM BROMIDE AND ALBUTEROL SULFATE 1 DOSE: .5; 3 SOLUTION RESPIRATORY (INHALATION) at 19:43

## 2025-03-10 RX ADMIN — CALCIUM ACETATE 1334 MG: 667 CAPSULE ORAL at 16:50

## 2025-03-10 RX ADMIN — WATER 1000 MG: 1 INJECTION INTRAMUSCULAR; INTRAVENOUS; SUBCUTANEOUS at 21:00

## 2025-03-10 RX ADMIN — IPRATROPIUM BROMIDE AND ALBUTEROL SULFATE 1 DOSE: .5; 3 SOLUTION RESPIRATORY (INHALATION) at 07:32

## 2025-03-10 RX ADMIN — PREDNISONE 40 MG: 20 TABLET ORAL at 08:59

## 2025-03-10 RX ADMIN — CALCIUM ACETATE 1334 MG: 667 CAPSULE ORAL at 12:46

## 2025-03-10 RX ADMIN — Medication 1 CAPSULE: at 08:59

## 2025-03-10 RX ADMIN — SENNOSIDES 8.6 MG: 8.6 TABLET, COATED ORAL at 08:59

## 2025-03-10 NOTE — PLAN OF CARE
Problem: Chronic Conditions and Co-morbidities  Goal: Patient's chronic conditions and co-morbidity symptoms are monitored and maintained or improved  3/9/2025 2016 by Marylu Cloud, RN  Outcome: Progressing  3/9/2025 1100 by Kiana Morrell RN  Outcome: Progressing  Flowsheets (Taken 3/9/2025 0800)  Care Plan - Patient's Chronic Conditions and Co-Morbidity Symptoms are Monitored and Maintained or Improved: Monitor and assess patient's chronic conditions and comorbid symptoms for stability, deterioration, or improvement

## 2025-03-10 NOTE — PLAN OF CARE
Problem: Chronic Conditions and Co-morbidities  Goal: Patient's chronic conditions and co-morbidity symptoms are monitored and maintained or improved  3/10/2025 0945 by Betty Dumont RN  Outcome: Progressing     Problem: Discharge Planning  Goal: Discharge to home or other facility with appropriate resources  Outcome: Progressing     Problem: Pain  Goal: Verbalizes/displays adequate comfort level or baseline comfort level  Outcome: Progressing     Problem: Hematologic - Adult  Goal: Maintains hematologic stability  Outcome: Progressing     Problem: Respiratory - Adult  Goal: Achieves optimal ventilation and oxygenation  Outcome: Progressing     Problem: Safety - Adult  Goal: Free from fall injury  Outcome: Progressing

## 2025-03-10 NOTE — CARE COORDINATION
Discharge Planning Note:    Chart reviewed and it appears that patient has minimal needs for discharge at this time. Risk Score 14 %     Primary Care Physician is Carisa Finn MD    Primary insurance is [employer] medicare    Please notify case management if any discharge needs are identified.      Case management will continue to follow progress and update discharge plan as needed.    No needs identified per PT/OT notes.     Electronically signed by Dustin Moreno on 3/10/2025 at 4:04 PM

## 2025-03-10 NOTE — PROGRESS NOTES
03/10/25 1953   NIV Type   $NIV   (patient refused and will have family bring home unit in.)

## 2025-03-10 NOTE — PROGRESS NOTES
1200ml of fluid removed  Spoke to patients RUSLAN dye and advised her the amount of fluid removed and that patient was returning to the floor.

## 2025-03-10 NOTE — PROGRESS NOTES
Hospitalist Progress Note      PCP: No primary care provider on file.    Date of Admission: 3/5/2025      Hospital Course: This 54 yo F with ESRD on PD and hypertension who presented with SOB.  Admitted as inpatient for PNA with R effusion.  Started on IV Abx.  Followed by Pulm and Renal.   Underwent R thoracentesis by IR on 3/6; about 1 L of fluid removed.  Fluid analysis  transudative.  Viral panel negative.PNA panel with Staph aureus, Strep pneumoniae, Mycoplasma pneumoniae and rhinovirus.  Patient continues to have SOB; repeat CXR today showed reaccumulation of pleural fluid.  IR consulted for repeat paracentesis.      Interval history:  Pt seen and examined today.  Overnight events noted, interval ancillary notes and labs reviewed.   On room air satting well.  But continues to report SOB.  Patient stated that she has been in the hospital for the past 5 days and wants to know what was going on with her.  Updated patient in detail about her diagnosis and medical plan.  Showed imaging and lab work.  Plan for repeat thoracentesis by IR        Assessment/Plan:      Strep pneumo and mycoplasma pneumonia   Molecular panel positive for staph RES Streptococcus pneumonia, mycoplasma pneumonia and hemorhino/enterovirusStaph aureus mecA gene not detected.  MRSA nares negative  Pulmonology on board; per their recs, complete 7-day course of ceftriaxone and azithromycin. Continue bronchodilators and antitussives.  Supplemental O2 to keep sats above 92%    Right pleural effusion s/p thoracentesis;   IR consulted; s/p about 1 L of fluid removed.  Fluid analysis lymphocytic predominant and has transudative characteristic likely 2/2 volume overload from ESRD  CXR today showed reaccumulation of pleural fluid.  IR consulted for repeat paracentesis.  IR consulted for repeat paracentesis.  Send pleural fluid for analysis.  If fluid analysis transudative then patient need to change to HD.    Acute bronchiolitis 2/2

## 2025-03-10 NOTE — PROGRESS NOTES
[facility], [facility] [place]   Electrophysiology   Date: 3/10/2025  Reason for Consultation: Sinus pause   Consult Requesting Physician: Aylin Stephens MD     Chief Complaint   Patient presents with    Shortness of Breath     SOB, swelling in RLE, a moist cough, peritoneal dialysis daily        CC: SOB   HPI: Donavon Caceres is a 55 y.o. 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 Symptoms were worse with laying flat and she was having BLE edema. CXR showed pneumonia and moderate R sided pleural effusion. Tested positive for Strep pneumo and Mycoplasma. She was started on antibiotics.    S/p R thoracentesis 3/6 with 1 L pulled.    Pt denies history of cardiac disease, no family history of cardiac problems. No history of syncope, denies episodes of lightheadedness, dizziness. She is on Coreg for her BP. She has history of sleep apnea and states she has not used CPAP for awhile.    Assessment/Plan:     Sinus pause  - 7.6 pause at 04:59 this AM, a few 3 second pauses preceding with otherwise stable HR overnight. Pt was asleep at time of pauses very likely due to sleep apnea.  -May stop Coreg and use alternative medication for blood pressure control  - Electrolytes unremarkable  - TSH 0.44  - Echo without significant structural disease  - No known history of bradycardia  - H/o JEZ without CPAP use, likely that her pause is secondary to O2 desaturation while she is sleeping since she has not had bradycardia otherwise. Recommend that she uses CPAP during admission and get established with sleep medicine provider  - No pacemaker indicated    R sided pleural effusion  - S/p thoracentesis with transudative fluid, considered 2/2 volume overload  - Setting of pneumonia  - Reaccumulation on CXR    Strep pneumo and Mycoplasma pneumonia/Acute bronchiolitis/Acute respiratory  notes including notes and data from other hospitals and prior records were reviewed.    Thank you for allowing me to participate in the care of this patient.    MEDICAL DECISION-MAKING COMPLEXITY    [] High (any 2)    A. Problems (any 1)  [] Acute/Chronic Illness/injury posing threat to life or bodily function:    [] Severe exacerbation of chronic illness:    ---------------------------------------------------------------------  B. Risk of Treatment (any 1)   [] Drugs/treatments that require intensive monitoring for toxicity include:    [] Change in code status:    [] Decision to escalate care:    [] Major surgery/procedure with associated risk factors:    ----------------------------------------------------------------------  C. Data (any 2)  [] Discussed management of the case with:    [] Imaging personally reviewed and interpreted, includes:    [] Data Review (any 3)  [] Collateral history obtained from:    [] All available Consultant notes from yesterday/today were reviewed  [] All current labs were reviewed and interpreted for clinical significance   [] Appropriate follow-up labs were ordered    NOTE: This report was transcribed using voice recognition software. Every effort was made to ensure accuracy, however, inadvertent computerized transcription errors may be present.

## 2025-03-10 NOTE — PROGRESS NOTES
Incentive Spirometry education and demonstration completed by Respiratory Therapy Yes      Response to education: Very Good     Teaching Time: 10 minutes    Minimum Predicted Vital Capacity - 478 mL.  Patient's Actual Vital Capacity - 1000 mL. Turning over to Nursing for routine follow-up Yes.        Electronically signed by Lico Nieto RCP on 3/10/2025 at 7:51 PM

## 2025-03-10 NOTE — PROGRESS NOTES
Pulmonary and Critical Care Medicine    CC: f/u pleural effusion   Date: 3/10/2025  Admit Date:  3/5/2025  Consult Requesting Physician: Aylin Stephens MD     HPI     This is a 54 y/o F w/ a hx of ESRD on PD, HTN who presented with SOB admitted with PNA, pleural effusion.     Overnight:  Today on RA   Today complaining of feeling \"winded\"  CXR large right efffusion   Cough non productive previously was productive     ROS: negative except stated above    OBJECTIVE DATA       PHYSICAL EXAM:   Temp  Av.3 °F (36.8 °C)  Min: 97.7 °F (36.5 °C)  Max: 98.5 °F (36.9 °C)  Pulse  Av.1  Min: 66  Max: 95  BP  Min: 140/60  Max: 171/93  SpO2  Av.7 %  Min: 96 %  Max: 100 %    General: NAD  Neuro: A0x3  Lung: Clear, no wheezing, equal non labored  Heart: regular rate  LE: warm perfused    MEDICATIONS: Reviewed  Scheduled Meds:   vancomycin (VANCOCIN) intermittent dosing (placeholder)   Other RX Placeholder    ipratropium 0.5 mg-albuterol 2.5 mg  1 Dose Inhalation Q4H WA RT    predniSONE  40 mg Oral Daily    carvedilol  12.5 mg Oral BID WC    calcium acetate  2 capsule Oral TID WC    amLODIPine  10 mg Oral Daily    sodium chloride flush  5-40 mL IntraVENous 2 times per day    heparin (porcine)  5,000 Units SubCUTAneous 3 times per day    cefTRIAXone (ROCEPHIN) IV  1,000 mg IntraVENous Q24H    azithromycin  500 mg IntraVENous Q24H    lactobacillus  1 capsule Oral BID WC     Continuous Infusions:   sodium chloride       PRN Meds:.sulfur hexafluoride microspheres, morphine, sodium chloride flush, sodium chloride, ondansetron, senna, acetaminophen **OR** acetaminophen     LABS: Reviewed.   Recent Labs     25  0521 25  0437 03/10/25  0352    141 146*   K 4.9 4.4 4.6    106 109   CO2 21 19* 21   BUN 44* 49* 59*   CREATININE 9.1* 9.3* 9.0*     Recent Labs     25  1102 25  0521 25  0437   WBC 5.3 4.4 8.8   HGB 11.3* 11.3* 11.7*   HCT 34.5* 34.2* 35.5*   MCV 84.6 85.6 85.4

## 2025-03-10 NOTE — PROGRESS NOTES
CCPD Order   Exchanges: 4   Exchange Volume: 1500 ml   Total Time: 9 hrs   Dextrose: 2.5 %   Last Fill: 0 ml   Total Volume: 6000 ml     Orders verified. Supplies taken to pt's room. Report received. Cycler set up, primed and pre tested. [facility]"" Cath [facility]. Pt connected to cycler. CCPD initiated without problem. Initial effluent clear.       If problems should arise please call the [phone] number on top of PD cycler machine.

## 2025-03-10 NOTE — PROGRESS NOTES
03/10/25 0237   RT Protocol   History Pulmonary Disease 0   Respiratory pattern 2   Breath sounds 2   Cough 1   Bronchodilator Assessment Score 5

## 2025-03-10 NOTE — PROGRESS NOTES
MD Dejan Aguilar MD Aldo Estella,                  Office: [phone]                      Fax: [phone]             [employer]                   NEPHROLOGY CONSULT PROGRESS NOTE    Subjective / interval history / medical decision making.  Underwent PD overnight, reportedly tolerating well  UF ~ 300 cc   Has been w/ UF ~600ml. Says breathing feels better after PD but still not at baseline. No new complaints.       IMPRESSION / RECOMMENDATIONS:    Admitted for--   Shortness of breath [R06.02]  Pleural effusion [J90]  Status post thoracentesis [Z98.890]  ESRD (end stage renal disease) on dialysis (HCC) [N18.6, Z99.2]  Elevated brain natriuretic peptide (BNP) level [R79.89]  Dyspnea, unspecified type [R06.00]  Pneumonia due to infectious organism, unspecified laterality, unspecified part of lung [J18.9]    #ESKD on CCPD nightly  -follows with  nephrology, Dr. Alcazar is her nephrologist.  -compliant with nightly treatments.  -EDW 66.7kg reported.  -per patient Rx 6L total, 1.5% dialysate bags (prior to constipation issues), 9 hours total tx time  -home prescription, but alternative 2.5% and 1.5% dialysate bags. 4 exchanges.  -adjust for increased UF: all bags 2.5% (green) for ongoing CCPD while inpatient. At discharge, return to alternating green and yellow bags.  -d/w PCC- Dr Cox - ?PD leak - for re-accumulation - will attempt PD dye injection to check that leak     -if (+) PD leak, will need to consider transition to HD  -if (-) , then still has more room for UD, as has been only ~300-600 cc/day    -hold PD tonight   -attempt IV lasix 80 mgx1 today         #hypervolemia   -slightly volume up. Improving on CCPD  -But with mild hypERnatremia, slightly increased free water deficit,  Follow-up with fluid restriction    Discharge-from renal standpoint- fup above  Per Pulm - not ready while needing more NC       #pleural effusion  -1L removed per IR thoracentesis 3/6/25.  -pulm

## 2025-03-10 NOTE — CARE COORDINATION
03/10/25 1132   IMM Letter   IMM Letter given to Patient/Family/Significant other/Guardian/POA/by: IMM Given   IMM Letter date given: 03/10/25   IMM Letter time given: 1131

## 2025-03-10 NOTE — PROGRESS NOTES
Disconnected from CCPD per protocol.  Effluent: clear  Total time: 9 hr 23 min   Total UF:  362 ml.  Total Volume:  6018 ml.  Dwell time gained:  2 hr 10 min.  Pt Tolerated procedure: well

## 2025-03-11 LAB
ALBUMIN SERPL-MCNC: 3.2 G/DL (ref 3.4–5)
ANION GAP SERPL CALCULATED.3IONS-SCNC: 11 MMOL/L (ref 3–16)
BUN SERPL-MCNC: 66 MG/DL (ref 7–20)
CALCIUM SERPL-MCNC: 8.9 MG/DL (ref 8.3–10.6)
CHLORIDE SERPL-SCNC: 110 MMOL/L (ref 99–110)
CO2 SERPL-SCNC: 22 MMOL/L (ref 21–32)
CREAT SERPL-MCNC: 8.9 MG/DL (ref 0.6–1.1)
GFR SERPLBLD CREATININE-BSD FMLA CKD-EPI: 5 ML/MIN/{1.73_M2}
GLUCOSE SERPL-MCNC: 93 MG/DL (ref 70–99)
HAV IGM SERPL QL IA: NORMAL
HBV CORE IGM SERPL QL IA: NORMAL
HBV SURFACE AB SERPL IA-ACNC: <3.5 MIU/ML
HBV SURFACE AG SERPL QL IA: NORMAL
HCV AB SERPL QL IA: NORMAL
PHOSPHATE SERPL-MCNC: 5.7 MG/DL (ref 2.5–4.9)
POTASSIUM SERPL-SCNC: 5.3 MMOL/L (ref 3.5–5.1)
SODIUM SERPL-SCNC: 143 MMOL/L (ref 136–145)
SPECIMEN SOURCE FLD: NORMAL
TRIGL FLD-MCNC: <10 MG/DL

## 2025-03-11 PROCEDURE — 6370000000 HC RX 637 (ALT 250 FOR IP): Performed by: INTERNAL MEDICINE

## 2025-03-11 PROCEDURE — 80074 ACUTE HEPATITIS PANEL: CPT

## 2025-03-11 PROCEDURE — 86706 HEP B SURFACE ANTIBODY: CPT

## 2025-03-11 PROCEDURE — 2060000000 HC ICU INTERMEDIATE R&B

## 2025-03-11 PROCEDURE — 36415 COLL VENOUS BLD VENIPUNCTURE: CPT

## 2025-03-11 PROCEDURE — 99233 SBSQ HOSP IP/OBS HIGH 50: CPT | Performed by: STUDENT IN AN ORGANIZED HEALTH CARE EDUCATION/TRAINING PROGRAM

## 2025-03-11 PROCEDURE — 80069 RENAL FUNCTION PANEL: CPT

## 2025-03-11 PROCEDURE — 94640 AIRWAY INHALATION TREATMENT: CPT

## 2025-03-11 PROCEDURE — 6360000002 HC RX W HCPCS: Performed by: PHYSICIAN ASSISTANT

## 2025-03-11 PROCEDURE — 2580000003 HC RX 258: Performed by: PHYSICIAN ASSISTANT

## 2025-03-11 PROCEDURE — 6360000002 HC RX W HCPCS: Performed by: INTERNAL MEDICINE

## 2025-03-11 PROCEDURE — 6370000000 HC RX 637 (ALT 250 FOR IP)

## 2025-03-11 PROCEDURE — 2500000003 HC RX 250 WO HCPCS: Performed by: PHYSICIAN ASSISTANT

## 2025-03-11 PROCEDURE — 5A1D70Z PERFORMANCE OF URINARY FILTRATION, INTERMITTENT, LESS THAN 6 HOURS PER DAY: ICD-10-PCS | Performed by: INTERNAL MEDICINE

## 2025-03-11 PROCEDURE — 94761 N-INVAS EAR/PLS OXIMETRY MLT: CPT

## 2025-03-11 PROCEDURE — 2500000003 HC RX 250 WO HCPCS: Performed by: INTERNAL MEDICINE

## 2025-03-11 PROCEDURE — 6370000000 HC RX 637 (ALT 250 FOR IP): Performed by: PHYSICIAN ASSISTANT

## 2025-03-11 RX ORDER — FUROSEMIDE 10 MG/ML
80 INJECTION INTRAMUSCULAR; INTRAVENOUS ONCE
Status: COMPLETED | OUTPATIENT
Start: 2025-03-11 | End: 2025-03-11

## 2025-03-11 RX ADMIN — WATER 1000 MG: 1 INJECTION INTRAMUSCULAR; INTRAVENOUS; SUBCUTANEOUS at 20:03

## 2025-03-11 RX ADMIN — IPRATROPIUM BROMIDE AND ALBUTEROL SULFATE 1 DOSE: .5; 3 SOLUTION RESPIRATORY (INHALATION) at 08:00

## 2025-03-11 RX ADMIN — CALCIUM ACETATE 1334 MG: 667 CAPSULE ORAL at 08:32

## 2025-03-11 RX ADMIN — IPRATROPIUM BROMIDE AND ALBUTEROL SULFATE 1 DOSE: .5; 3 SOLUTION RESPIRATORY (INHALATION) at 12:30

## 2025-03-11 RX ADMIN — SODIUM CHLORIDE, PRESERVATIVE FREE 10 ML: 5 INJECTION INTRAVENOUS at 08:38

## 2025-03-11 RX ADMIN — MORPHINE SULFATE 2 MG: 2 INJECTION, SOLUTION INTRAMUSCULAR; INTRAVENOUS at 10:13

## 2025-03-11 RX ADMIN — CALCIUM ACETATE 1334 MG: 667 CAPSULE ORAL at 16:11

## 2025-03-11 RX ADMIN — SODIUM CHLORIDE, PRESERVATIVE FREE 10 ML: 5 INJECTION INTRAVENOUS at 20:04

## 2025-03-11 RX ADMIN — AMLODIPINE BESYLATE 10 MG: 5 TABLET ORAL at 08:32

## 2025-03-11 RX ADMIN — IPRATROPIUM BROMIDE AND ALBUTEROL SULFATE 1 DOSE: .5; 3 SOLUTION RESPIRATORY (INHALATION) at 16:57

## 2025-03-11 RX ADMIN — FUROSEMIDE 80 MG: 10 INJECTION, SOLUTION INTRAMUSCULAR; INTRAVENOUS at 16:11

## 2025-03-11 RX ADMIN — Medication 1 CAPSULE: at 16:11

## 2025-03-11 RX ADMIN — CARVEDILOL 12.5 MG: 6.25 TABLET, FILM COATED ORAL at 08:32

## 2025-03-11 RX ADMIN — CARVEDILOL 12.5 MG: 6.25 TABLET, FILM COATED ORAL at 16:11

## 2025-03-11 RX ADMIN — Medication 1 CAPSULE: at 08:37

## 2025-03-11 RX ADMIN — PREDNISONE 40 MG: 20 TABLET ORAL at 08:32

## 2025-03-11 RX ADMIN — AZITHROMYCIN MONOHYDRATE 500 MG: 500 INJECTION, POWDER, LYOPHILIZED, FOR SOLUTION INTRAVENOUS at 20:36

## 2025-03-11 ASSESSMENT — PAIN SCALES - GENERAL
PAINLEVEL_OUTOF10: 0
PAINLEVEL_OUTOF10: 7

## 2025-03-11 ASSESSMENT — PAIN DESCRIPTION - LOCATION: LOCATION: BACK

## 2025-03-11 ASSESSMENT — PAIN DESCRIPTION - ORIENTATION: ORIENTATION: LOWER

## 2025-03-11 ASSESSMENT — PAIN DESCRIPTION - DESCRIPTORS: DESCRIPTORS: SORE;ACHING

## 2025-03-11 NOTE — CONSULTS
[facility], [facility] [place]   Electrophysiology   Date: 3/10/2025  Reason for Consultation: Sinus pause   Consult Requesting Physician: Aylin Stephens MD     Chief Complaint   Patient presents with    Shortness of Breath     SOB, swelling in RLE, a moist cough, peritoneal dialysis daily        CC: SOB   HPI: Donavon Caceres is a 55 y.o. 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 Symptoms were worse with laying flat and she was having BLE edema. CXR showed pneumonia and moderate R sided pleural effusion. Tested positive for Strep pneumo and Mycoplasma. She was started on antibiotics.    S/p R thoracentesis 3/6 with 1 L pulled.    Pt denies history of cardiac disease, no family history of cardiac problems. No history of syncope, denies episodes of lightheadedness, dizziness. She is on Coreg for her BP. She has history of sleep apnea and states she has not used CPAP for awhile.    Assessment/Plan:     Sinus pause  - 7.6 pause at 04:59 this AM, a few 3 second pauses preceding with otherwise stable HR overnight. Pt was asleep at time of pauses very likely due to sleep apnea.  -May stop Coreg and use alternative medication for blood pressure control  - Electrolytes unremarkable  - TSH 0.44  - Echo without significant structural disease  - No known history of bradycardia  - H/o JEZ without CPAP use, likely that her pause is secondary to O2 desaturation while she is sleeping since she has not had bradycardia otherwise. Recommend that she uses CPAP during admission and get established with sleep medicine provider  - No pacemaker indicated    R sided pleural effusion  - S/p thoracentesis with transudative fluid, considered 2/2 volume overload  - Setting of pneumonia  - Reaccumulation on CXR    Strep pneumo and Mycoplasma pneumonia/Acute bronchiolitis/Acute respiratory  failure  - Positive for rhinovirus and positive polymicrobial pneumonia panel  -   - Continue steroids and antibiotics  - Pulm following    ESRD  - On PD  - Nephrology following, sees transplant at     HTN  - Stable  - Continue antihypertensive meds, adjust as needed        Relevant available labs, and cardiovascular diagnostics, documents reviewed.   Telemetry:   Sinus rhythm HR 89    Recent Labs     03/08/25  0521 03/09/25  0437 03/10/25  0352    141 146*   K 4.9 4.4 4.6    106 109   CO2 21 19* 21   BUN 44* 49* 59*   CREATININE 9.1* 9.3* 9.0*     Recent Labs     03/08/25  0521 03/09/25  0437   WBC 4.4 8.8   HGB 11.3* 11.7*   HCT 34.2* 35.5*   MCV 85.6 85.4    285     Lab Results   Component Value Date/Time    TROPHS 34 03/05/2025 06:29 PM     No results found for: \"BNP\"  Lab Results   Component Value Date/Time    PROTIME 13.9 03/05/2025 05:15 PM    PROTIME 10.0 08/20/2014 04:50 PM    INR 1.05 03/05/2025 05:15 PM    INR 0.93 08/20/2014 04:50 PM     No results found for: \"CHOL\", \"HDL\", \"TRIG\"    ECG 3/5  Normal sinus rhythm    Telemetry 04:59 3/10      Echo 3/6/2025    Left Ventricle: Normal left ventricular systolic function with a visually estimated EF of 55 - 60%. Left ventricle size is normal. Mildly increased wall thickness. Findings consistent with mild concentric hypertrophy. Normal wall motion. Diastolic dysfunction present with normal LV EF.    Right Ventricle: Right ventricle size is normal. Normal systolic function.    Aortic Valve: Not well visualized. Mildly thickened cusps. Mild regurgitation.    Tricuspid Valve: Unable to assess RVSP due to inadequate or insignificant tricuspid regurgitation.    Pericardium: Trivial pericardial effusion present. No indication of cardiac tamponade.    Extracardiac: Pleural effusion.    CXR 3/10  Slight decrease in large R pleural effusion     Scheduled Meds:   vancomycin (VANCOCIN) intermittent dosing (placeholder)   Other RX Placeholder

## 2025-03-11 NOTE — CARE COORDINATION
Discharge Planning:    Per rounds. Pt wants to go where her PD was done and hospital Dr. Garza will initiate the transfer to .    Electronically signed by Dustin Moreno on 3/11/2025 at 3:28 PM

## 2025-03-11 NOTE — PROGRESS NOTES
Spoke with patients RN via phone to attempt to discussed doing Tunneled HD catheter.  RN informed patient is currently refusing to do HD via tunneled line.  Bedside RN was given IR RN number. [phone] to call with update or changes.

## 2025-03-11 NOTE — PROGRESS NOTES
Pulmonary and Critical Care Medicine    CC: f/u pleural effusion   Date: 3/11/2025  Admit Date:  3/5/2025  Consult Requesting Physician: Kathy Garza MD     HPI     This is a 54 y/o F w/ a hx of ESRD on PD, HTN who presented with SOB admitted with PNA, pleural effusion.     Overnight:  She had thoracentesis yesterday 1.2L removed  Today she feels much better from breathing perspective after thoracentesis  Fluid studies transudate with pleural fluid and serum glucose > 1    ROS: negative except stated above    OBJECTIVE DATA       PHYSICAL EXAM:   Temp  Av.7 °F (37.1 °C)  Min: 97.6 °F (36.4 °C)  Max: 99.2 °F (37.3 °C)  Pulse  Av.4  Min: 69  Max: 86  BP  Min: 131/87  Max: 151/98  SpO2  Av.9 %  Min: 95 %  Max: 100 %    General: NAD  Neuro: A0x3  Lung: CTA non labored   Heart: regular rate    MEDICATIONS: Reviewed  Scheduled Meds:   vancomycin (VANCOCIN) intermittent dosing (placeholder)   Other RX Placeholder    ipratropium 0.5 mg-albuterol 2.5 mg  1 Dose Inhalation Q4H WA RT    predniSONE  40 mg Oral Daily    carvedilol  12.5 mg Oral BID WC    calcium acetate  2 capsule Oral TID WC    amLODIPine  10 mg Oral Daily    sodium chloride flush  5-40 mL IntraVENous 2 times per day    heparin (porcine)  5,000 Units SubCUTAneous 3 times per day    cefTRIAXone (ROCEPHIN) IV  1,000 mg IntraVENous Q24H    azithromycin  500 mg IntraVENous Q24H    lactobacillus  1 capsule Oral BID WC     Continuous Infusions:   sodium chloride       PRN Meds:.polyethylene glycol, sulfur hexafluoride microspheres, morphine, sodium chloride flush, sodium chloride, ondansetron, senna, acetaminophen **OR** acetaminophen     LABS: Reviewed.   Recent Labs     25  0437 03/10/25  0352 25  044    146* 143   K 4.4 4.6 5.3*    109 110   CO2 19* 21 22   PHOS  --   --  5.7*   BUN 49* 59* 66*   CREATININE 9.3* 9.0* 8.9*     Recent Labs     25   WBC 8.8   HGB 11.7*   HCT 35.5*   MCV 85.4        Lab  Results   Component Value Date/Time    PROTIME 13.9 03/05/2025 05:15 PM    PROTIME 10.0 08/20/2014 04:50 PM    INR 1.05 03/05/2025 05:15 PM    INR 0.93 08/20/2014 04:50 PM     No results found for: \"FDL4VFB\", \"BEART\", \"H5KWDGJE\", \"PHART\", \"THGBART\", \"RTR0MXH\", \"PO2ART\", \"MMD1XCZ\"    Intake/Output Summary (Last 24 hours) at 3/11/2025 1143  Last data filed at 3/11/2025 0951  Gross per 24 hour   Intake 540 ml   Output --   Net 540 ml      In: 540 [P.O.:540]  Out: -      IMAGES: Reviewed         ASSESSMENT AND PLAN:     Acute hypoxic respiratory failure - resolved   Right-sided pleural effusion - suspect pleuroperitoneal leak   Pneumonia   Pneumonia panel + polymicrobial - Staph, Strep, mycoplasma)  Rhinovirus +  End-stage renal disease on peritoneal dialysis    Recommendation:  - thoracentesis x 2 - 3/6 and 3/10  - cytology x 1 neg, cytology from 3/10 pending   - reviewed pleural fluid studies from 3/11 - 1.2L, transudate, pleural fluid:serum glucose > 1  - clinically my suspicion for pleuroperitoneal leak is high   - certainly we can do a radioactive tag scan to establish diagnosis  - options include changing to HD which the patient is unwilling  - if she wants to continue PD, she can get a surgical opinion on pleurodesis (often unsuccessful) vs diaphragm fenestration repair  - I explained unfortunately if she continue PD, she will have recurrent pleural effusion, needing recurrent thoracentesis  - if this process continue due to prolonged period she will develop lung entrapment which can not be corrected without surgery  - I encouraged her to discuss with her primary nephrologist at  and also get a second opinion at    - as for her PNA, finish 7 days of total abx  - I will get her a follow up with me in 1 month     Discussed with nephrology and hospital     Pulmonary medicine will sign off.     High risk 55 minutes     Ruddy Huertas MD  Pulmonary and Critical Care Medicine   [facility]

## 2025-03-11 NOTE — PROGRESS NOTES
Hospitalist Progress Note      PCP: No primary care provider on file.    Date of Admission: 3/5/2025      Hospital Course: This 54 yo F with ESRD on PD and hypertension who presented with SOB.  Admitted as inpatient for PNA with R effusion.  Started on IV Abx.  Followed by Pulm and Renal.   Underwent R thoracentesis by IR on 3/6 and 3/10 with 1 L and 1.2 L of fluid removed respectively. Fluid analysis  transudative with pleural fluid to serum glucose ratio >1 concerning for pleural peritoneal leak. PNA panel positive for Staph aureus, Strep pneumoniae, Mycoplasma pneumoniae and rhinovirus.       Interval history:  Pt seen and examined today.  Overnight events noted, interval ancillary notes and labs reviewed.   On room air satting well, denies any SOB.    Assessment/Plan:      Strep pneumo and mycoplasma pneumonia:  Molecular panel positive for staph RES Streptococcus pneumonia, mycoplasma pneumonia and hemorhino/enterovirusStaph aureus mecA gene not detected.  MRSA nares negative  Pulmonology on board; per their recs, complete 7-day course of ceftriaxone and azithromycin. Continue bronchodilators and antitussives.     Recurrent right pleural effusion s/p thoracentesis;   IR consulted; s/p R-sided thoracentesis x 2 on 3/6 and 310 with removal of 1 L and 1.2 L fluid removed.  Fluid analysis lymphocytic predominant and has transudative characteristic.  Pleural fluid: Serum glucose > 1 concerning for pleuroperitoneal leak.  Radioactive tag scan to establish diagnosis can be done to establish diagnosis, however unavailable at [facility].  Patient unwilling to transition to HD.  Pulmonology recommending transfer to  to discuss this further with her nephrologist and get a surgical opinion on pleurodesis (often unsuccessful) vs diaphragm fenestration repair.    Acute bronchiolitis 2/2 rhino/enterovirus:  Complete course of steroids.    Continue breathing treatment and supportive care    Acute respiratory distress/hypoxic  respiratory failure 2/2 acute bronchiolitis and pneumonia:  Currently weaned off supplemental oxygen  Continue monitoring to keep SpO2 > 92%    End-stage renal disease on PD; nephrology consulted for PD  Continue peritoneal dialysis.  Nephrology following    Hypertension; continue current regimen and monitor BP closely      DVT Prophylaxis: Hep SQ  Code Status: Full Code  PT/OT Eval Status: Requested    Dispo -transfer to       Medications:  Reviewed      Intake/Output Summary (Last 24 hours) at 3/11/2025 1451  Last data filed at 3/11/2025 0951  Gross per 24 hour   Intake 300 ml   Output --   Net 300 ml         Physical Exam Performed:    BP (!) 155/83   Pulse 75   Temp 98.7 °F (37.1 °C) (Oral)   Resp 16   Ht 1.524 m (5')   Wt 71.2 kg (156 lb 15.5 oz)   SpO2 95%   BMI 30.66 kg/m²     General appearance: No apparent distress, appears stated age and cooperative.  Respiratory: Scattered basilar Rales.  No wheezing  Cardiovascular: Regular rate and rhythm with normal S1/S2 without murmurs, rubs or gallops.  Abdomen: Soft, PD catheter, non-tender, non-distended with normal bowel sounds.  Musculoskeletal: No edema.  Full range of motion without deformity.  Neurologic:  Neurovascularly intact without any focal sensory/motor deficits.     Labs:   Recent Labs     03/09/25  0437   WBC 8.8   HGB 11.7*   HCT 35.5*        Recent Labs     03/09/25  0437 03/10/25  0352 03/11/25  0449    146* 143   K 4.4 4.6 5.3*    109 110   CO2 19* 21 22   BUN 49* 59* 66*   CREATININE 9.3* 9.0* 8.9*   CALCIUM 8.8 8.9 8.9   PHOS  --   --  5.7*     No results for input(s): \"AST\", \"ALT\", \"BILIDIR\", \"BILITOT\", \"ALKPHOS\" in the last 72 hours.    No results for input(s): \"INR\" in the last 72 hours.    No results for input(s): \"CKTOTAL\", \"TROPHS\" in the last 72 hours.      Urinalysis:      Lab Results   Component Value Date/Time    NITRU Negative 03/05/2025 05:15 PM    WBCUA 3 03/05/2025 05:15 PM    BACTERIA Rare 03/05/2025

## 2025-03-11 NOTE — PROGRESS NOTES
MD Dejan Aguilar MD Aldo Estella, DO                 Office: [phone]                      Fax: [phone]             [facility]                   NEPHROLOGY CONSULT PROGRESS NOTE    Subjective / interval history / nephrology update / medical decision making:   Refer to assessment and plan for more details.   Patient was seen comfortably  in bed,   Reported no active complaints/distress,   Renal labs, vital signs, recent input output reviewed/noted.    Patient's last PD was Sunday night, with about 362 cc UF only  Per PD nurse effluent was clear and tolerated well next    After that still reaccumulated fluid with right-sided pleural effusion so patient had thoracocentesis done, on Monday, with 1.2 L removed-as per pulmonologist     Overall she reports feeling better.  On room air  Without hypoxia  Discussed with pulmonologist, who believes that transudative fluid with pleural fluid with pulmonary fluid to serum glucose ratio more than 1  - They believe with suggestive of leak of peritoneal fluid in her pleural effusion    Patient is very tearful  I discussed with patient and her PD nurse Teresa from [facility] [place] where she does home dialysis    I offered her to consider home hemodialysis, and she does have a partner at home, but patient is not interested currently  In past she was on hemodialysis via tunneled catheter in right chest next      IMPRESSION / RECOMMENDATIONS:    Admitted for--   Shortness of breath [R06.02]  Pleural effusion [J90]  Status post thoracentesis [Z98.890]  ESRD (end stage renal disease) on dialysis (HCC) [N18.6, Z99.2]  Elevated brain natriuretic peptide (BNP) level [R79.89]  Dyspnea, unspecified type [R06.00]  Pneumonia due to infectious organism, unspecified laterality, unspecified part of lung [J18.9]    #ESKD on CCPD nightly  -follows with  nephrology, Dr. Alcazar is her nephrologist.  -compliant with nightly treatments.  -EDW 66.7kg reported.  -per  (DELTASONE) tablet 40 mg, 40 mg, Oral, Daily  carvedilol (COREG) tablet 12.5 mg, 12.5 mg, Oral, BID WC  calcium acetate (PHOSLO) capsule 1,334 mg, 2 capsule, Oral, TID WC  sulfur hexafluoride microspheres (LUMASON) 60.7-25 MG injection 2 mL, 2 mL, IntraVENous, ONCE PRN  morphine (PF) injection 2 mg, 2 mg, IntraVENous, Q4H PRN  amLODIPine (NORVASC) tablet 10 mg, 10 mg, Oral, Daily  sodium chloride flush 0.9 % injection 5-40 mL, 5-40 mL, IntraVENous, 2 times per day  sodium chloride flush 0.9 % injection 5-40 mL, 5-40 mL, IntraVENous, PRN  0.9 % sodium chloride infusion, , IntraVENous, PRN  ondansetron (ZOFRAN) injection 4 mg, 4 mg, IntraVENous, Q6H PRN  senna (SENOKOT) tablet 8.6 mg, 1 tablet, Oral, Daily PRN  acetaminophen (TYLENOL) tablet 650 mg, 650 mg, Oral, Q6H PRN **OR** acetaminophen (TYLENOL) suppository 650 mg, 650 mg, Rectal, Q6H PRN  heparin (porcine) injection 5,000 Units, 5,000 Units, SubCUTAneous, 3 times per day  cefTRIAXone (ROCEPHIN) 1,000 mg in sterile water 10 mL IV syringe, 1,000 mg, IntraVENous, Q24H  azithromycin (ZITHROMAX) 500 mg in sodium chloride 0.9 % 250 mL IVPB ([id number]), 500 mg, IntraVENous, Q24H  lactobacillus (CULTURELLE) capsule 1 capsule, 1 capsule, Oral, BID   Allergies:    Patient has no known allergies.   Social History:     reports that she has never smoked. She has never used smokeless tobacco. She reports that she does not drink alcohol and does not use drugs.   Family History:   family history includes High Blood Pressure in her mother; No Known Problems in her father.     REVIEW OF SYSTEMS:    System review was done , pertinent positives are mentioned in the HPI    Negative fatigue  No chest pain nor palpitations  No hemoptysis  No abdominal pain, nausea, vomiting nor diarrhea  No fever/chills  No change in urine output nor gross hematuria    PHYSICAL EXAM:    Vitals:  BP (!) 155/83   Pulse 75   Temp 98.7 °F (37.1 °C) (Oral)   Resp 16   Ht 1.524 m (5')   Wt 71.2 kg

## 2025-03-11 NOTE — PLAN OF CARE
Problem: Discharge Planning  Goal: Discharge to home or other facility with appropriate resources  Outcome: Progressing     Problem: Pain  Goal: Verbalizes/displays adequate comfort level or baseline comfort level  Outcome: Progressing     Problem: Hematologic - Adult  Goal: Maintains hematologic stability  Outcome: Progressing     Problem: Respiratory - Adult  Goal: Achieves optimal ventilation and oxygenation  Outcome: Progressing     Problem: Safety - Adult  Goal: Free from fall injury  Outcome: Progressing

## 2025-03-11 NOTE — PLAN OF CARE
Problem: Chronic Conditions and Co-morbidities  Goal: Patient's chronic conditions and co-morbidity symptoms are monitored and maintained or improved  Outcome: Progressing     Problem: Discharge Planning  Goal: Discharge to home or other facility with appropriate resources  Outcome: Progressing     Problem: Pain  Goal: Verbalizes/displays adequate comfort level or baseline comfort level  Outcome: Progressing     Problem: Hematologic - Adult  Goal: Maintains hematologic stability  Outcome: Progressing     Problem: Respiratory - Adult  Goal: Achieves optimal ventilation and oxygenation  Outcome: Progressing     Problem: Safety - Adult  Goal: Free from fall injury  Outcome: Progressing

## 2025-03-12 LAB
ALBUMIN SERPL-MCNC: 3.4 G/DL (ref 3.4–5)
ANION GAP SERPL CALCULATED.3IONS-SCNC: 13 MMOL/L (ref 3–16)
BUN SERPL-MCNC: 80 MG/DL (ref 7–20)
CALCIUM SERPL-MCNC: 8.3 MG/DL (ref 8.3–10.6)
CHLORIDE SERPL-SCNC: 106 MMOL/L (ref 99–110)
CO2 SERPL-SCNC: 19 MMOL/L (ref 21–32)
CREAT SERPL-MCNC: 9.2 MG/DL (ref 0.6–1.1)
GFR SERPLBLD CREATININE-BSD FMLA CKD-EPI: 5 ML/MIN/{1.73_M2}
GLUCOSE SERPL-MCNC: 133 MG/DL (ref 70–99)
MAGNESIUM SERPL-MCNC: 2.33 MG/DL (ref 1.8–2.4)
PHOSPHATE SERPL-MCNC: 4.3 MG/DL (ref 2.5–4.9)
POTASSIUM SERPL-SCNC: 5.7 MMOL/L (ref 3.5–5.1)
SODIUM SERPL-SCNC: 138 MMOL/L (ref 136–145)
VANCOMYCIN SERPL-MCNC: 14 UG/ML

## 2025-03-12 PROCEDURE — 6370000000 HC RX 637 (ALT 250 FOR IP): Performed by: PHYSICIAN ASSISTANT

## 2025-03-12 PROCEDURE — 2060000000 HC ICU INTERMEDIATE R&B

## 2025-03-12 PROCEDURE — 6360000002 HC RX W HCPCS: Performed by: INTERNAL MEDICINE

## 2025-03-12 PROCEDURE — 2500000003 HC RX 250 WO HCPCS: Performed by: INTERNAL MEDICINE

## 2025-03-12 PROCEDURE — 2500000003 HC RX 250 WO HCPCS: Performed by: PHYSICIAN ASSISTANT

## 2025-03-12 PROCEDURE — 2580000003 HC RX 258: Performed by: PHYSICIAN ASSISTANT

## 2025-03-12 PROCEDURE — 0JH63XZ INSERTION OF TUNNELED VASCULAR ACCESS DEVICE INTO CHEST SUBCUTANEOUS TISSUE AND FASCIA, PERCUTANEOUS APPROACH: ICD-10-PCS | Performed by: INTERNAL MEDICINE

## 2025-03-12 PROCEDURE — 6370000000 HC RX 637 (ALT 250 FOR IP): Performed by: INTERNAL MEDICINE

## 2025-03-12 PROCEDURE — 36415 COLL VENOUS BLD VENIPUNCTURE: CPT

## 2025-03-12 PROCEDURE — 80202 ASSAY OF VANCOMYCIN: CPT

## 2025-03-12 PROCEDURE — 02HV33Z INSERTION OF INFUSION DEVICE INTO SUPERIOR VENA CAVA, PERCUTANEOUS APPROACH: ICD-10-PCS | Performed by: INTERNAL MEDICINE

## 2025-03-12 PROCEDURE — 6360000002 HC RX W HCPCS: Performed by: PHYSICIAN ASSISTANT

## 2025-03-12 PROCEDURE — 80069 RENAL FUNCTION PANEL: CPT

## 2025-03-12 PROCEDURE — 83735 ASSAY OF MAGNESIUM: CPT

## 2025-03-12 RX ORDER — VANCOMYCIN HYDROCHLORIDE 1 G/200ML
15 INJECTION, SOLUTION INTRAVENOUS ONCE
Status: COMPLETED | OUTPATIENT
Start: 2025-03-12 | End: 2025-03-12

## 2025-03-12 RX ORDER — IPRATROPIUM BROMIDE AND ALBUTEROL SULFATE 2.5; .5 MG/3ML; MG/3ML
1 SOLUTION RESPIRATORY (INHALATION) EVERY 4 HOURS PRN
Status: DISCONTINUED | OUTPATIENT
Start: 2025-03-12 | End: 2025-03-13 | Stop reason: HOSPADM

## 2025-03-12 RX ORDER — FUROSEMIDE 10 MG/ML
80 INJECTION INTRAMUSCULAR; INTRAVENOUS ONCE
Status: COMPLETED | OUTPATIENT
Start: 2025-03-12 | End: 2025-03-12

## 2025-03-12 RX ADMIN — VANCOMYCIN HYDROCHLORIDE 1000 MG: 1 INJECTION, SOLUTION INTRAVENOUS at 16:12

## 2025-03-12 RX ADMIN — CARVEDILOL 12.5 MG: 6.25 TABLET, FILM COATED ORAL at 18:24

## 2025-03-12 RX ADMIN — CALCIUM ACETATE 1334 MG: 667 CAPSULE ORAL at 11:56

## 2025-03-12 RX ADMIN — CALCIUM ACETATE 1334 MG: 667 CAPSULE ORAL at 08:07

## 2025-03-12 RX ADMIN — SODIUM CHLORIDE, PRESERVATIVE FREE 10 ML: 5 INJECTION INTRAVENOUS at 20:01

## 2025-03-12 RX ADMIN — SODIUM ZIRCONIUM CYCLOSILICATE 10 G: 10 POWDER, FOR SUSPENSION ORAL at 13:30

## 2025-03-12 RX ADMIN — CARVEDILOL 12.5 MG: 6.25 TABLET, FILM COATED ORAL at 08:06

## 2025-03-12 RX ADMIN — FUROSEMIDE 80 MG: 10 INJECTION, SOLUTION INTRAMUSCULAR; INTRAVENOUS at 13:27

## 2025-03-12 RX ADMIN — Medication 1 CAPSULE: at 08:06

## 2025-03-12 RX ADMIN — CALCIUM ACETATE 1334 MG: 667 CAPSULE ORAL at 18:23

## 2025-03-12 RX ADMIN — AZITHROMYCIN MONOHYDRATE 500 MG: 500 INJECTION, POWDER, LYOPHILIZED, FOR SOLUTION INTRAVENOUS at 21:03

## 2025-03-12 RX ADMIN — Medication 1 CAPSULE: at 18:24

## 2025-03-12 RX ADMIN — PREDNISONE 40 MG: 20 TABLET ORAL at 08:07

## 2025-03-12 RX ADMIN — AMLODIPINE BESYLATE 10 MG: 5 TABLET ORAL at 08:07

## 2025-03-12 RX ADMIN — WATER 1000 MG: 1 INJECTION INTRAMUSCULAR; INTRAVENOUS; SUBCUTANEOUS at 20:00

## 2025-03-12 NOTE — CONSULTS
Clinical Pharmacy Note: Pharmacy to Dose Vancomycin    Vancomycin Day: 7  Indication: PNA  Current Dose: intermittent 1g  Dosing Method: Dosing by random levels    Random: 14    Recent Labs     03/11/25  0449 03/12/25  0513   BUN 66* 80*       Recent Labs     03/11/25  0449 03/12/25  0513   CREATININE 8.9* 9.2*       No results for input(s): \"WBC\" in the last 72 hours.      Intake/Output Summary (Last 24 hours) at 3/12/2025 1324  Last data filed at 3/12/2025 0943  Gross per 24 hour   Intake 240 ml   Output --   Net 240 ml         Ht Readings from Last 1 Encounters:   03/10/25 1.524 m (5')        Wt Readings from Last 1 Encounters:   03/12/25 73 kg (160 lb 15 oz)         Body mass index is 31.43 kg/m².    Estimated Creatinine Clearance: 6 mL/min (A) (based on SCr of 9.2 mg/dL (HH)).      Assessment/Plan:  Vancomycin level is therapeutic.  Will redose vancomycin 1g one time today.   No more levels.  Changes in regimen will be determined based on culture results, renal function, and clinical response.  Pharmacy will continue to monitor and adjust regimen as necessary.    Thank you for the consult,    Drake Sepulveda PharmD, [facility]  Clinical Pharmacy Specialist  [phone]

## 2025-03-12 NOTE — PLAN OF CARE
Problem: Chronic Conditions and Co-morbidities  Goal: Patient's chronic conditions and co-morbidity symptoms are monitored and maintained or improved  Outcome: Progressing  Flowsheets (Taken 3/12/2025 0945)  Care Plan - Patient's Chronic Conditions and Co-Morbidity Symptoms are Monitored and Maintained or Improved: Monitor and assess patient's chronic conditions and comorbid symptoms for stability, deterioration, or improvement     Problem: Discharge Planning  Goal: Discharge to home or other facility with appropriate resources  3/12/2025 0945 by Vladimir Mi RN  Outcome: Progressing  Flowsheets (Taken 3/12/2025 0945)  Discharge to home or other facility with appropriate resources: Identify barriers to discharge with patient and caregiver     Problem: Pain  Goal: Verbalizes/displays adequate comfort level or baseline comfort level  Outcome: Progressing  Flowsheets (Taken 3/12/2025 0945)  Verbalizes/displays adequate comfort level or baseline comfort level: Encourage patient to monitor pain and request assistance

## 2025-03-12 NOTE — PROGRESS NOTES
MD Dejan Aguilar MD Aldo Estella, DO                 Office: [phone]                      Fax: [phone]             [place]                   NEPHROLOGY CONSULT PROGRESS NOTE    Subjective / interval history / nephrology update / medical decision making:   Refer to assessment and plan for more details.   Patient was seen comfortably  in bed,   Reported no active complaints/distress,   Renal labs, vital signs, recent input output reviewed/noted    Her brother at bedside  On 3-11 - her outpt PD nurse on phone.    Patient's last PD was Sunday night, with about 362 cc UF only  Per PD nurse effluent was clear and tolerated well      After that still reaccumulated fluid with right-sided pleural effusion so patient had thoracocentesis done, on Monday, with 1.2 L removed-as per pulmonologist     Overall she reports feeling better.  On room air  Without hypoxia  Discussed with pulmonologist, who believes that transudative fluid with pleural fluid with pulmonary fluid to serum glucose ratio more than 1  - They believe with suggestive of leak of peritoneal fluid in her pleural effusion     I discussed with patient and her PD nurse Teresa from [facility] [place] where she does home dialysis    I offered her to consider home hemodialysis, and she does have a partner at home, but patient is not interested currently  In past she was on hemodialysis via tunneled catheter in right chest        IMPRESSION / RECOMMENDATIONS:    Admitted for--   Shortness of breath [R06.02]  Pleural effusion [J90]  Status post thoracentesis [Z98.890]  ESRD (end stage renal disease) on dialysis (HCC) [N18.6, Z99.2]  Elevated brain natriuretic peptide (BNP) level [R79.89]  Dyspnea, unspecified type [R06.00]  Pneumonia due to infectious organism, unspecified laterality, unspecified part of lung [J18.9]    #ESKD on CCPD nightly  -follows with  nephrology, Dr. Alcazar is her nephrologist.  -compliant with nightly  Wt 73 kg (160 lb 15 oz)   SpO2 95%   BMI 31.43 kg/m²   WEIGHT: well tolerated  I/O's since admission    CONSTITUTIONAL:  awake, alert, cooperative, no apparent distress, and appears stated age  EYES:  Lids and lashes normal, pupils equal, round   NECK:  Supple, symmetrical, trachea midline  HEMATOLOGIC/LYMPHATICS:  no pallor, no cervical LAD  LUNGS:  No increased work of breathing, diminished bibasilar breaths  CARDIOVASCULAR:  regular rate and rhythm, normal S1 and S2  ABDOMEN:  No scars, normal bowel sounds, soft, non-distended, non-tender  EXTREMITIES:  Warm, dry, edema trace  NEUROLOGIC:  Awake, alert, oriented to name, place and time.      DATA:    CBC:   Lab Results   Component Value Date/Time    WBC 8.8 03/09/2025 04:37 AM    RBC 4.16 03/09/2025 04:37 AM    HGB 11.7 03/09/2025 04:37 AM    HCT 35.5 03/09/2025 04:37 AM    MCV 85.4 03/09/2025 04:37 AM    MCH 28.2 03/09/2025 04:37 AM    MCHC 33.0 03/09/2025 04:37 AM    RDW 19.2 03/09/2025 04:37 AM     03/09/2025 04:37 AM    MPV 6.0 03/09/2025 04:37 AM     BMP:   Lab Results   Component Value Date/Time     03/12/2025 05:13 AM    K 5.7 03/12/2025 05:13 AM    K 4.9 03/08/2025 05:21 AM     03/12/2025 05:13 AM    CO2 19 03/12/2025 05:13 AM    BUN 80 03/12/2025 05:13 AM    CREATININE 9.2 03/12/2025 05:13 AM    CALCIUM 8.3 03/12/2025 05:13 AM    GFRAA 37 08/20/2014 04:50 PM    LABGLOM 5 03/12/2025 05:13 AM    GLUCOSE 133 03/12/2025 05:13 AM   Magnesium:    Lab Results   Component Value Date/Time    MG 2.33 03/12/2025 05:13 AM   Phosphorus:    Lab Results   Component Value Date/Time    PHOS 4.3 03/12/2025 05:13 AM

## 2025-03-12 NOTE — DISCHARGE SUMMARY
V2.0  Discharge Summary    Name:  Donavon Caceres /Age/Sex: 1970 (55 y.o. female)   Admit Date: 3/5/2025  Discharge Date: 3/12/25    MRN & CSN:  [id number] & [id number] Encounter Date and Time 3/12/25 11:35 AM EDT    Attending:  Kathy Garza MD Discharging Provider: Kathy Garza MD       Hospital Course:     Brief HPI: Donavon Caceres is a 55 y.o. female with PMHx of ESRD on PD and HTN, presented with SOB, admitted as inpatient for PNA with R effusion. Underwent R thoracentesis by IR on 3/6 and 3/10 with 1 L and 1.2 L of fluid removed respectively. Fluid analysis Transudative with pleural fluid to serum glucose ratio >1 concerning for pleuroperitoneal leak. PNA panel positive for Staph aureus, Strep pneumoniae, Mycoplasma pneumoniae and rhinovirus.      Strep pneumo and mycoplasma pneumonia:  Molecular panel positive for staph aureus, Streptococcus pneumonia, mycoplasma pneumonia and hemorhino/enterovirus.   Staph aureus mecA gene not detected. MRSA nares negative  Pulmonology consulted: completed recommended 7-day course of ceftriaxone and azithromycin, STOP date 3/13/25.  Continue bronchodilators and antitussives.      Recurrent right pleural effusion:   IR consulted; s/p R-sided thoracentesis x 2 on 3/6 and 310 with removal of 1 L and 1.2 L fluid removed.  Fluid analysis lymphocytic predominant and has transudative characteristic.  Pleural fluid: Serum glucose > 1 concerning for pleuroperitoneal leak.  Radioactive tag scan to establish diagnosis can be done to establish diagnosis, however unavailable at [facility].  Patient now agreeable to HD. IR to place TDC.  Pulmonology recommending transfer to  to discuss this further with her nephrologist and get a surgical opinion on pleurodesis (often unsuccessful) vs diaphragm fenestration repair.     Acute bronchiolitis 2/2 rhino/enterovirus:  Completed course of steroids.    Continue breathing treatments prn.     Acute respiratory distress/hypoxic

## 2025-03-13 ENCOUNTER — APPOINTMENT (OUTPATIENT)
Dept: INTERVENTIONAL RADIOLOGY/VASCULAR | Age: 55
DRG: 177 | End: 2025-03-13
Payer: MEDICARE

## 2025-03-13 VITALS
RESPIRATION RATE: 17 BRPM | HEIGHT: 60 IN | TEMPERATURE: 98.5 F | HEART RATE: 79 BPM | DIASTOLIC BLOOD PRESSURE: 99 MMHG | OXYGEN SATURATION: 93 % | WEIGHT: 160.72 LBS | SYSTOLIC BLOOD PRESSURE: 139 MMHG | BODY MASS INDEX: 31.55 KG/M2

## 2025-03-13 LAB
ALBUMIN SERPL-MCNC: 3.3 G/DL (ref 3.4–5)
ANION GAP SERPL CALCULATED.3IONS-SCNC: 14 MMOL/L (ref 3–16)
BUN SERPL-MCNC: 90 MG/DL (ref 7–20)
CALCIUM SERPL-MCNC: 8.6 MG/DL (ref 8.3–10.6)
CHLORIDE SERPL-SCNC: 104 MMOL/L (ref 99–110)
CO2 SERPL-SCNC: 19 MMOL/L (ref 21–32)
CREAT SERPL-MCNC: 10.1 MG/DL (ref 0.6–1.1)
GFR SERPLBLD CREATININE-BSD FMLA CKD-EPI: 4 ML/MIN/{1.73_M2}
GLUCOSE SERPL-MCNC: 127 MG/DL (ref 70–99)
MAGNESIUM SERPL-MCNC: 2.45 MG/DL (ref 1.8–2.4)
PHOSPHATE SERPL-MCNC: 5.4 MG/DL (ref 2.5–4.9)
POTASSIUM SERPL-SCNC: 5.7 MMOL/L (ref 3.5–5.1)
SODIUM SERPL-SCNC: 137 MMOL/L (ref 136–145)

## 2025-03-13 PROCEDURE — 99152 MOD SED SAME PHYS/QHP 5/>YRS: CPT

## 2025-03-13 PROCEDURE — 36556 INSERT NON-TUNNEL CV CATH: CPT

## 2025-03-13 PROCEDURE — 6360000002 HC RX W HCPCS: Performed by: INTERNAL MEDICINE

## 2025-03-13 PROCEDURE — 36415 COLL VENOUS BLD VENIPUNCTURE: CPT

## 2025-03-13 PROCEDURE — 90935 HEMODIALYSIS ONE EVALUATION: CPT

## 2025-03-13 PROCEDURE — 80069 RENAL FUNCTION PANEL: CPT

## 2025-03-13 PROCEDURE — 6360000002 HC RX W HCPCS: Performed by: PHYSICIAN ASSISTANT

## 2025-03-13 PROCEDURE — C1769 GUIDE WIRE: HCPCS

## 2025-03-13 PROCEDURE — 76937 US GUIDE VASCULAR ACCESS: CPT

## 2025-03-13 PROCEDURE — 2500000003 HC RX 250 WO HCPCS: Performed by: PHYSICIAN ASSISTANT

## 2025-03-13 PROCEDURE — 77001 FLUOROGUIDE FOR VEIN DEVICE: CPT

## 2025-03-13 PROCEDURE — 36558 INSERT TUNNELED CV CATH: CPT

## 2025-03-13 PROCEDURE — 83735 ASSAY OF MAGNESIUM: CPT

## 2025-03-13 RX ORDER — FENTANYL CITRATE 50 UG/ML
INJECTION, SOLUTION INTRAMUSCULAR; INTRAVENOUS PRN
Status: COMPLETED | OUTPATIENT
Start: 2025-03-13 | End: 2025-03-13

## 2025-03-13 RX ORDER — LIDOCAINE HYDROCHLORIDE 10 MG/ML
10 INJECTION, SOLUTION EPIDURAL; INFILTRATION; INTRACAUDAL; PERINEURAL ONCE
Status: COMPLETED | OUTPATIENT
Start: 2025-03-13 | End: 2025-03-13

## 2025-03-13 RX ORDER — MIDAZOLAM HYDROCHLORIDE 2 MG/2ML
INJECTION, SOLUTION INTRAMUSCULAR; INTRAVENOUS PRN
Status: COMPLETED | OUTPATIENT
Start: 2025-03-13 | End: 2025-03-13

## 2025-03-13 RX ORDER — LIDOCAINE HYDROCHLORIDE AND EPINEPHRINE 10; 10 MG/ML; UG/ML
7.5 INJECTION, SOLUTION INFILTRATION; PERINEURAL ONCE
Status: COMPLETED | OUTPATIENT
Start: 2025-03-13 | End: 2025-03-13

## 2025-03-13 RX ORDER — HEPARIN SODIUM 5000 [USP'U]/ML
6000 INJECTION, SOLUTION INTRAVENOUS; SUBCUTANEOUS ONCE
Status: COMPLETED | OUTPATIENT
Start: 2025-03-13 | End: 2025-03-13

## 2025-03-13 RX ADMIN — HEPARIN SODIUM 3200 UNITS: 5000 INJECTION INTRAVENOUS; SUBCUTANEOUS at 14:34

## 2025-03-13 RX ADMIN — LIDOCAINE HYDROCHLORIDE 10 ML: 10 INJECTION, SOLUTION EPIDURAL; INFILTRATION; INTRACAUDAL; PERINEURAL at 14:34

## 2025-03-13 RX ADMIN — MIDAZOLAM HYDROCHLORIDE 1 MG: 1 INJECTION, SOLUTION INTRAMUSCULAR; INTRAVENOUS at 14:18

## 2025-03-13 RX ADMIN — LIDOCAINE HYDROCHLORIDE,EPINEPHRINE BITARTRATE 7.5 ML: 10; .01 INJECTION, SOLUTION INFILTRATION; PERINEURAL at 14:35

## 2025-03-13 RX ADMIN — WATER 1000 MG: 1 INJECTION INTRAMUSCULAR; INTRAVENOUS; SUBCUTANEOUS at 14:15

## 2025-03-13 RX ADMIN — MORPHINE SULFATE 2 MG: 2 INJECTION, SOLUTION INTRAMUSCULAR; INTRAVENOUS at 19:38

## 2025-03-13 RX ADMIN — FENTANYL CITRATE 50 MCG: 50 INJECTION, SOLUTION INTRAMUSCULAR; INTRAVENOUS at 14:18

## 2025-03-13 ASSESSMENT — PAIN SCALES - GENERAL
PAINLEVEL_OUTOF10: 0
PAINLEVEL_OUTOF10: 8

## 2025-03-13 ASSESSMENT — PAIN DESCRIPTION - LOCATION
LOCATION: SHOULDER
LOCATION: CHEST

## 2025-03-13 ASSESSMENT — PAIN DESCRIPTION - ORIENTATION: ORIENTATION: LEFT

## 2025-03-13 ASSESSMENT — PAIN DESCRIPTION - DESCRIPTORS: DESCRIPTORS: SORE

## 2025-03-13 NOTE — PRE SEDATION
Sedation Pre-Procedure Note    Patient Name: Donavon Caceres   YOB: 1970  Room/Bed: [facility]-3369/3369-01  Medical Record Number: [id number]  Date: 3/13/2025   Time: 1:49 PM       Indication:  ESRD - Tunneled HD catheter placement    Consent: I have discussed with the patient and/or the patient representative the indication, alternatives, and the possible risks and/or complications of the planned procedure and the anesthesia methods. The patient and/or patient representative appear to understand and agree to proceed.    Vital Signs:   Vitals:    03/13/25 1158   BP: (!) 160/92   Pulse: 69   Resp: 18   Temp: 98.5 °F (36.9 °C)   SpO2: 97%       Past Medical History:   has a past medical history of Cerebral artery occlusion with cerebral infarction (HCC), History of anemia, Hypertension, and Wears glasses.    Past Surgical History:   has a past surgical history that includes fracture surgery (Right) and Fibula Fracture Surgery (Right, 2/18/2022).    Medications:   Scheduled Meds:    cefTRIAXone (ROCEPHIN) IV  1,000 mg IntraVENous Once    [START ON 3/14/2025] cefTRIAXone (ROCEPHIN) IV  1,000 mg IntraVENous Q24H    vancomycin (VANCOCIN) intermittent dosing (placeholder)   Other RX Placeholder    carvedilol  12.5 mg Oral BID WC    calcium acetate  2 capsule Oral TID WC    amLODIPine  10 mg Oral Daily    sodium chloride flush  5-40 mL IntraVENous 2 times per day    heparin (porcine)  5,000 Units SubCUTAneous 3 times per day    azithromycin  500 mg IntraVENous Q24H    lactobacillus  1 capsule Oral BID WC     Continuous Infusions:    sodium chloride       PRN Meds: ipratropium 0.5 mg-albuterol 2.5 mg, polyethylene glycol, sulfur hexafluoride microspheres, morphine, sodium chloride flush, sodium chloride, ondansetron, senna, acetaminophen **OR** acetaminophen  Home Meds:   Prior to Admission medications    Medication Sig Start Date End Date Taking? Authorizing Provider   calcium carb-cholecalciferol (OYSTER SHELL

## 2025-03-13 NOTE — CARE COORDINATION
03/13/25 1046   IMM Letter   IMM Letter given to Patient/Family/Significant other/Guardian/POA/by: IMM Given   IMM Letter date given: 03/13/25   IMM Letter time given: 1045

## 2025-03-13 NOTE — PROGRESS NOTES
Treatment time: 3 hours    Net UF: 2.5 liters, doctor Bryan was aware of it.    Pre weight: 72.9 kg  Post weight: 70.4 kg  EDW: form PD to HD    Access used: Left CVC  Access function: Access site located on the Left chest wall had clean dry and intact CVC gauze, transparent dressing. Swelling and mild pain complain as it was just placed today. Both ports had good flow. Verified Used through X-ray Report.    Medications or blood products given: Heparin dwell    Regular outpatient schedule: Patient from PD to HD    Summary of response to treatment: 15:00: Treatment set at 2.5 liters for 3 hours via left CVC freshly inserted. today. Access site located on the Left chest wall had clean dry and intact CVC gauze, transparent dressing. Swelling and mild pain complain as it was just placed today. Both ports had good flow. Verified Used through X-ray Report. Parameters set accordingly. Hooked to Hd machine. Monitored from time to time. 18:03: Treatment completed. Returned blood aseptically. HD tolerated well.    Copy of dialysis treatment record placed in chart, to be scanned into EMR.

## 2025-03-13 NOTE — PROGRESS NOTES
Hospitalist Progress Note      PCP: No primary care provider on file.    Date of Admission: 3/5/2025      Hospital Course: This 54 yo F with ESRD on PD and hypertension who presented with SOB.  Admitted as inpatient for PNA with R effusion.  Started on IV Abx.  Followed by Pulm and Renal.   Underwent R thoracentesis by IR on 3/6 and 3/10 with 1 L and 1.2 L of fluid removed respectively. Fluid analysis  transudative with pleural fluid to serum glucose ratio >1 concerning for pleural peritoneal leak. PNA panel positive for Staph aureus, Strep pneumoniae, Mycoplasma pneumoniae and rhinovirus.       Interval history:  Pt seen and examined today.  Overnight events noted, interval ancillary notes and labs reviewed.   On room air satting well, denies any SOB.    Assessment/Plan:      Strep pneumo and mycoplasma pneumonia:  Molecular panel positive for staph RES Streptococcus pneumonia, mycoplasma pneumonia and hemorhino/enterovirusStaph aureus mecA gene not detected.  MRSA nares negative  Pulmonology on board; per their recs, complete 7-day course of ceftriaxone and azithromycin. Continue bronchodilators and antitussives.     Recurrent right pleural effusion s/p thoracentesis;   IR consulted; s/p R-sided thoracentesis x 2 on 3/6 and 310 with removal of 1 L and 1.2 L fluid removed.  Fluid analysis lymphocytic predominant and has transudative characteristic.  Pleural fluid: Serum glucose > 1 concerning for pleuroperitoneal leak.  Radioactive tag scan to establish diagnosis can be done to establish diagnosis, however unavailable at [facility].  Patient unwilling to transition to HD.  Pulmonology recommending transfer to  to discuss this further with her nephrologist and get a surgical opinion on pleurodesis (often unsuccessful) vs diaphragm fenestration repair.    Acute bronchiolitis 2/2 rhino/enterovirus:  Complete course of steroids.    Continue breathing treatment and supportive care    Acute respiratory distress/hypoxic  05:15 PM    RBCUA 0 03/05/2025 05:15 PM    BLOODU Negative 03/05/2025 05:15 PM    GLUCOSEU Negative 03/05/2025 05:15 PM    GLUCOSEU NEGATIVE 06/12/2011 07:50 AM       Radiology:  IR GUIDED THORACENTESIS PLEURAL   Final Result   Successful ultrasound guided right thoracentesis.         XR CHEST 1 VIEW   Final Result   No pneumothorax status post right thoracentesis.  Small to moderate residual   right pleural effusion.         XR CHEST PORTABLE   Final Result   Slight decrease in the volume of a large right pleural effusion.         XR CHEST PORTABLE   Final Result   Complete opacification of the right hemithorax.         IR GUIDED THORACENTESIS PLEURAL   Final Result   Successful ultrasound guided right thoracentesis.         XR CHEST 1 VIEW   Final Result   Status post right thoracentesis with elimination of right pleural effusion.   No evidence of pneumothorax.         XR CHEST (2 VW)   Final Result   Moderate right pleural effusion with right lower lobe airspace infiltrates   that could represent a combination of atelectasis and/or pneumonia         IR TUNNELED CVC PLACE WO SQ PORT/PUMP > 5 YEARS    (Results Pending)       IP CONSULT TO NEPHROLOGY  IP CONSULT TO INTERVENTIONAL RADIOLOGY  IP CONSULT TO NEPHROLOGY  IP CONSULT TO PULMONOLOGY  IP CONSULT TO PHARMACY  IP CONSULT TO CARDIOLOGY            Kathy Garza MD

## 2025-03-13 NOTE — PROGRESS NOTES
MD Dejan Aguilar MD Aldo Estella,                  Office: [phone]                      Fax: [phone]             [facility] [employer]                   NEPHROLOGY CONSULT PROGRESS NOTE    Subjective / interval history / nephrology update / medical decision making:   Refer to assessment and plan for more details.   Patient was seen comfortably  in bed,   Reported no active complaints/distress,   Renal labs, vital signs, recent input output reviewed/noted    On 3-11 - her outpt PD nurse on phone.  On 3-13 - dw her  nephrologist - Dr Johnson - agreed for planb    Overall she reports feeling better.  On room air  Without hypoxia      IMPRESSION / RECOMMENDATIONS:    Admitted for--   Shortness of breath [R06.02]  Pleural effusion [J90]  Status post thoracentesis [Z98.890]  ESRD (end stage renal disease) on dialysis (HCC) [N18.6, Z99.2]  Elevated brain natriuretic peptide (BNP) level [R79.89]  Dyspnea, unspecified type [R06.00]  Pneumonia due to infectious organism, unspecified laterality, unspecified part of lung [J18.9]    #ESKD on CCPD nightly  -follows with  nephrology, Dr. Alcazar - Dr Johnson  is her nephrologist.  -compliant with nightly treatments.  -EDW 66.7kg reported.  -per patient Rx 6L total, 1.5% dialysate bags (prior to constipation issues), 9 hours total tx time  -home prescription, but alternative 2.5% and 1.5% dialysate bags. 4 exchanges.     Likely (+) PD leak, so we suggested to transition to iHD and then eventually she can continue home HD  Her PD nurse confirmed that they can treat her with home HD at her current PD center    But patient had refused  Patient wants a second opinion -that is understandably reasonable,  I encouraged her to consider going to , where her primary nephrology at  can follow-up closely with her  -Can consider nuclear-radioactive tag screen to confirm the diagnosis and  then VATS by CT surgery, if patient wants to preserve doing PD    Holding PD

## 2025-03-13 NOTE — PLAN OF CARE
Problem: Chronic Conditions and Co-morbidities  Goal: Patient's chronic conditions and co-morbidity symptoms are monitored and maintained or improved  Outcome: Progressing  Flowsheets (Taken 3/13/2025 1012)  Care Plan - Patient's Chronic Conditions and Co-Morbidity Symptoms are Monitored and Maintained or Improved: Monitor and assess patient's chronic conditions and comorbid symptoms for stability, deterioration, or improvement     Problem: Discharge Planning  Goal: Discharge to home or other facility with appropriate resources  3/13/2025 1012 by Vladimir Mi RN  Outcome: Progressing  Flowsheets (Taken 3/13/2025 1012)  Discharge to home or other facility with appropriate resources: Identify barriers to discharge with patient and caregiver     Problem: Pain  Goal: Verbalizes/displays adequate comfort level or baseline comfort level  Outcome: Progressing  Flowsheets (Taken 3/13/2025 1012)  Verbalizes/displays adequate comfort level or baseline comfort level: Encourage patient to monitor pain and request assistance

## 2025-03-14 NOTE — PROGRESS NOTES
Discharge orders received. Transport arrived at 1945. Patient's belongings collected, all questions answered. Report given to transport and  RUSLAN Hua at [phone]. Patient left facility via stretcher at 2000.

## 2025-03-18 LAB
ACID FAST STN SPEC QL: NORMAL
MYCOBACTERIUM SPEC CULT: NORMAL

## 2025-03-25 LAB
ACID FAST STN SPEC QL: NORMAL
MYCOBACTERIUM SPEC CULT: NORMAL

## 2025-04-01 LAB
ACID FAST STN SPEC QL: NORMAL
MYCOBACTERIUM SPEC CULT: NORMAL

## 2025-04-08 LAB
ACID FAST STN SPEC QL: NORMAL
MYCOBACTERIUM SPEC CULT: NORMAL

## 2025-04-15 LAB
ACID FAST STN SPEC QL: NORMAL
MYCOBACTERIUM SPEC CULT: NORMAL

## 2025-04-22 LAB
ACID FAST STN SPEC QL: NORMAL
MYCOBACTERIUM SPEC CULT: NORMAL

## (undated) DEVICE — STRIP,CLOSURE,WOUND,MEDI-STRIP,1/2X4: Brand: MEDLINE

## (undated) DEVICE — Device

## (undated) DEVICE — SOLUTION IV IRRIG POUR BRL 0.9% SODIUM CHL 2F7124

## (undated) DEVICE — BANDAGE COMPR W6INXL12FT SMOOTH FOR LIMB EXSANG ESMARCH

## (undated) DEVICE — BIT DRL DIA2MM STD QUIK CONN FOR PERIARTC LOK PLT SYS

## (undated) DEVICE — GLOVE SURG SZ 65 L12IN FNGR THK79MIL GRN LTX FREE

## (undated) DEVICE — GOWN SIRUS NONREIN XL W/TWL: Brand: MEDLINE INDUSTRIES, INC.

## (undated) DEVICE — GLOVE SURG SZ 65 THK91MIL LTX FREE SYN POLYISOPRENE

## (undated) DEVICE — LOWER EXTREMITY: Brand: MEDLINE INDUSTRIES, INC.

## (undated) DEVICE — C-ARM: Brand: UNBRANDED

## (undated) DEVICE — GLOVE SURG SZ 8 CRM LTX FREE POLYISOPRENE POLYMER BEAD ANTI

## (undated) DEVICE — ZIMMER® STERILE DISPOSABLE TOURNIQUET CUFF WITH PLC, DUAL PORT, SINGLE BLADDER, 18 IN. (46 CM)

## (undated) DEVICE — TOWEL,OR,DSP,ST,BLUE,STD,4/PK,20PK/CS: Brand: MEDLINE

## (undated) DEVICE — NEEDLE HYPO 22GA L1 1/2IN PIVOTING SHLD FOR LUERLOCK SYR

## (undated) DEVICE — SUTURE VCRL + SZ 2-0 L18IN ABSRB UD CT1 L36MM 1/2 CIR VCP839D

## (undated) DEVICE — BIT DRL L100MM DIA2.5MM FOR SM FRAG UNIV LOK SYS

## (undated) DEVICE — SUTURE VCRL + SZ 3-0 L18IN ABSRB UD SH 1/2 CIR TAPERCUT NDL VCP864D

## (undated) DEVICE — COTTON UNDERCAST PADDING,CRIMPED FINISH: Brand: WEBRIL

## (undated) DEVICE — BANDAGE COMPR W6INXL10YD ST M E WHITE/BEIGE

## (undated) DEVICE — DRESSING,GAUZE,XEROFORM,CURAD,1"X8",ST: Brand: CURAD

## (undated) DEVICE — 3M™ STERI-STRIP™ COMPOUND BENZOIN TINCTURE 40 BAGS/CARTON 4 CARTONS/CASE C1544: Brand: 3M™ STERI-STRIP™

## (undated) DEVICE — SUTURE MCRYL + SZ 4-0 L18IN ABSRB UD L19MM PS-2 3/8 CIR MCP496G